# Patient Record
Sex: MALE | Race: WHITE | Employment: FULL TIME | URBAN - METROPOLITAN AREA
[De-identification: names, ages, dates, MRNs, and addresses within clinical notes are randomized per-mention and may not be internally consistent; named-entity substitution may affect disease eponyms.]

---

## 2017-10-27 ENCOUNTER — OFFICE VISIT (OUTPATIENT)
Dept: ORTHOPEDIC SURGERY | Age: 57
End: 2017-10-27

## 2017-10-27 VITALS
SYSTOLIC BLOOD PRESSURE: 118 MMHG | BODY MASS INDEX: 24.11 KG/M2 | WEIGHT: 150 LBS | HEIGHT: 66 IN | DIASTOLIC BLOOD PRESSURE: 72 MMHG | HEART RATE: 74 BPM

## 2017-10-27 DIAGNOSIS — S43.431A TEAR OF RIGHT GLENOID LABRUM, INITIAL ENCOUNTER: ICD-10-CM

## 2017-10-27 DIAGNOSIS — M25.511 RIGHT SHOULDER PAIN, UNSPECIFIED CHRONICITY: Primary | ICD-10-CM

## 2017-10-27 PROCEDURE — 73030 X-RAY EXAM OF SHOULDER: CPT | Performed by: ORTHOPAEDIC SURGERY

## 2017-10-27 PROCEDURE — 99215 OFFICE O/P EST HI 40 MIN: CPT | Performed by: ORTHOPAEDIC SURGERY

## 2017-10-27 RX ORDER — METHYLPREDNISOLONE 4 MG/1
TABLET ORAL
COMMUNITY
Start: 2017-09-27

## 2017-10-27 RX ORDER — ATORVASTATIN CALCIUM 20 MG/1
20 TABLET, FILM COATED ORAL
COMMUNITY
Start: 2017-09-27

## 2017-10-28 NOTE — PROGRESS NOTES
12 [facility]  History and Physical  Upper Extremity Pain    Chief Complaint    Shoulder Pain (np right shoulder. pain for 1 month )      History of Present Illness:Review  Brie Burks is a 62 y.o. male with about 1 month of shoulder pain. He notes a single episode where he helt a twinge of pain reaching to  a shoe and the pain has not resolved. He has also noted some sporadic numbness in his R hand as well during this time. Pain Assessment  Location of Pain: Shoulder  Location Modifiers: Right  Severity of Pain: 3  Quality of Pain: Sharp, Aching  Duration of Pain: Persistent  Frequency of Pain: Constant  Aggravating Factors:  (certain movement )  Limiting Behavior: Yes  Relieving Factors:  (no relief )  Result of Injury: No  Work-Related Injury: No  Are there other pain locations you wish to document?: No       Medical History:    Review of Systems   Musculoskeletal: Positive for joint pain and neck pain. Right shoulder pain                12 [facility]  History and Physical  Upper Extremity Pain    Chief Complaint    Shoulder Pain (np right shoulder. pain for 1 month )      History of Present Illness:Review  Brie Burks is a 62 y.o. male with about 1 month of shoulder pain. He notes a single episode where he helt a twinge of pain reaching to  a shoe and the pain has not resolved. He has also noted some sporadic numbness in his R hand as well during this time.      Pain Assessment  Location of Pain: Shoulder  Location Modifiers: Right  Severity of Pain: 3  Quality of Pain: Sharp, Aching  Duration of Pain: Persistent  Frequency of Pain: Constant  Aggravating Factors:  (certain movement )  Limiting Behavior: Yes  Relieving Factors:  (no relief )  Result of Injury: No  Work-Related Injury: No  Are there other pain locations you wish to document?: No       Medical History:    Review of Systems   Musculoskeletal: Positive for joint pain and neck pain. Right shoulder pain        Patient's medications, allergies, past medical, surgical, social and family histories were reviewed and updated as appropriate. Past Medical History:   Diagnosis Date    Hyperlipidemia     MVP (mitral valve prolapse)       Social History     Social History    Marital status:      Spouse name: N/A    Number of children: N/A    Years of education: N/A     Occupational History    Not on file. Social History Main Topics    Smoking status: Former Smoker    Smokeless tobacco: Never Used      Comment: QUIT 1984 AFTER SMOKING A FEW YEARS    Alcohol use 3.6 oz/week     6 Cans of beer per week      Comment: ON WEEKENDS    Drug use: No    Sexual activity: Not on file     Other Topics Concern    Not on file     Social History Narrative    No narrative on file       Allergies   Allergen Reactions    Codeine Other (See Comments)     Mood altering. Causes pt to become mean, short tempered.  Sulfa Antibiotics      Current Outpatient Prescriptions on File Prior to Visit   Medication Sig Dispense Refill    VIAGRA 50 MG tablet       ibuprofen (ADVIL;MOTRIN) 800 MG tablet Take 1 tablet by mouth every 8 hours as needed for Pain 30 tablet 0    methocarbamol (ROBAXIN) 500 MG tablet Take 1 tablet by mouth 4 times daily as needed May take 1-3 pills, do not exceed 16 pills in a day. 40 tablet 0    oxyCODONE-acetaminophen (PERCOCET) 5-325 MG per tablet 1-2 pills every 4 hours as needed for pain. 15 tablet 0    atorvastatin (LIPITOR) 20 MG tablet Take 20 mg by mouth daily.  aspirin 81 MG tablet Take 81 mg by mouth daily. No current facility-administered medications on file prior to visit.           Review of Systems:  A 14 point review of systems available in the scanned medical record, under the media tab, as documented by the patient.   The review is negative with the exception of those things mentioned in the HPI and Past Medical History. Vital Signs:  Vitals:    10/27/17 1422   BP: 118/72   Pulse: 74       General Exam:   Constitutional: Patient is adequately groomed with no evidence of malnutrition  DTRs: Deep tendon reflexes are intact  Mental Status: The patient is oriented to time, place and person. The patient's mood and affect are appropriate. Lymphatic: The lymphatic examination bilaterally reveals all areas to be without enlargement or induration. Vascular: Examination reveals no swelling or calf tenderness. Peripheral pulses are palpable and 2+. Neurological: The patient has good coordination. There is no weakness or sensory deficit. Upper Extremity:    Right shoulder exam    Inspection:  No gross deformities, periscapular musculature is symmetric without atrophy. There is LESS scapular winging with abduction    Palpation: No significant tenderness overlying the rotator cuff footprint, bicipital groove, AC joint, or periscapular region. TTP over the posterior capsule and external rotators as well as supraspinatus. Active/Passive ROM: full in forward flexion, abduction, external rotation with the elbow at the side. IROT limited in abduction compared to contralateral side. Strength: 5/5 strength testing of deltoid, supraspinatus, infraspinatus, teres minor, and subscapularis. Stability: negative sulcus sign, no evidence of instability    Neurovascular: Neurovascularly intact    Special Tests:Pain with empty can. (+) O'arabella.         L comparison shoulder exam    Inspection:  No gross deformities, periscapular musculature is symmetry without atrophy, no obvious winging    Palpation: No significant tenderness overlying the rotator cuff footprint, bicipital groove, AC joint, or periscapular region    Active/Passive ROM: full in forward flexion, abduction, external rotation with the elbow at the side, and internal rotation    Strength: 5/5 strength testing of deltoid, supraspinatus, infraspinatus, teres abduction, external rotation with the elbow at the side. IROT limited in abduction compared to contralateral side. Strength: 5/5 strength testing of deltoid, supraspinatus, infraspinatus, teres minor, and subscapularis. Stability: negative sulcus sign, no evidence of instability    Neurovascular: Neurovascularly intact    Special Tests:Pain with empty can. (+) O'arabella. L comparison shoulder exam    Inspection:  No gross deformities, periscapular musculature is symmetry without atrophy, no obvious winging    Palpation: No significant tenderness overlying the rotator cuff footprint, bicipital groove, AC joint, or periscapular region    Active/Passive ROM: full in forward flexion, abduction, external rotation with the elbow at the side, and internal rotation    Strength: 5/5 strength testing of deltoid, supraspinatus, infraspinatus, teres minor, and subscapularis    Stability: negative sulcus sign, no evidence of instability    Neurovascular: Neurovascularly intact      Imaging:     3 view plain radiographs of the L shoulder reviewed in the office: NO fracture or dislocation. NO evidence of tumor. Some evidence of AC arthrosis        Assessment :  L shoulder Posterior labral injury    Impression:  Encounter Diagnoses   Name Primary?  Right shoulder pain, unspecified chronicity Yes    Tear of right glenoid labrum, initial encounter          Plan:  We discussed the pathophysiology and treatment options. We  Will begin with some PT and he can take some OTC NSAIDS. He should improve with some capsule stretching and light cuff exercises. He can continue taekwando if it is non tender. We will see him back in about 4 weeks. Rajesh Felix  Fellow, [address] [place]  Date:    10/28/2017          I supervised my sports medicine fellow in the evaluation and development of a treatment plan  for this patient.   I personally interviewed the patient and performed a physical examination. In addition, I discussed the patient's condition and treatment options with them. All of their questions were answered. I personally reviewed the patient's pain scale, review of systems, family history, social history, past medical history, allergies and medications. 13 point review of systems was collected today and is filed in the medical record. Greater than 50% of the visit was spent counseling the patient. Ignacia Terrazas MD  Sports Medicine, Arthroscopic Knee and Shoulder Surgery    This dictation was performed with a verbal recognition program [facility]) and it was checked for errors. It is possible that there are still dictated errors within this office note. If so, please bring any errors to my attention for an addendum. All efforts were made to ensure that this office note is accurate.

## 2017-10-30 ENCOUNTER — HOSPITAL ENCOUNTER (OUTPATIENT)
Dept: PHYSICAL THERAPY | Age: 57
Discharge: HOME | End: 2017-10-31
Attending: ORTHOPAEDIC SURGERY | Admitting: ORTHOPAEDIC SURGERY

## 2017-11-01 ENCOUNTER — HOSPITAL ENCOUNTER (OUTPATIENT)
Dept: PHYSICAL THERAPY | Age: 57
Discharge: HOME | End: 2017-11-30
Attending: ORTHOPAEDIC SURGERY | Admitting: ORTHOPAEDIC SURGERY

## 2017-11-03 ENCOUNTER — HOSPITAL ENCOUNTER (OUTPATIENT)
Dept: PHYSICAL THERAPY | Age: 57
Discharge: HOME | End: 2017-11-03
Admitting: ORTHOPAEDIC SURGERY

## 2017-11-03 NOTE — FLOWSHEET NOTE
Scap Pinches x20    Flexion     Abduction     External Rotation     Internal Rotation     Shrugs     EXT     Reverse Flys     Serratus     Horizontal Abd with ER     Biceps     Triceps     Retraction          Cable Column/Theraband     External Rotation     Internal Rotation     Shrugs     Lats     Ext     Flex     Scapular Retraction     BIC     TRIC     PNF          Dynamic Stability          Plyoback          Manual interventions  15'   Supine Thoracic Manip - P-A x1    Seated Mid Thoracic Manip x1    Seated Cervicothoracic Manip x1                    Therapeutic Exercise and NMR EXR  [] (09865) Provided verbal/tactile cueing for activities related to strengthening, flexibility, endurance, ROM  for improvements in scapular, scapulothoracic and UE control with self care, reaching, carrying, lifting, house/yardwork, driving/computer work. [x] (96213) Provided verbal/tactile cueing for activities related to improving balance, coordination, kinesthetic sense, posture, motor skill, proprioception  to assist with  scapular, scapulothoracic and UE control with self care, reaching, carrying, lifting, house/yardwork, driving/computer work. Therapeutic Activities:    [] (45973 or 01204) Provided verbal/tactile cueing for activities related to improving balance, coordination, kinesthetic sense, posture, motor skill, proprioception and motor activation to allow for proper function of scapular, scapulothoracic and UE control with self care, carrying, lifting, driving/computer work.      Home Exercise Program:    [x] (97210) Reviewed/Progressed HEP activities related to strengthening, flexibility, endurance, ROM of scapular, scapulothoracic and UE control with self care, reaching, carrying, lifting, house/yardwork, driving/computer work  [] (51201) Reviewed/Progressed HEP activities related to improving balance, coordination, kinesthetic sense, posture, motor skill, proprioception of scapular, scapulothoracic and UE control with self care, reaching, carrying, lifting, house/yardwork, driving/computer work      Manual Treatments:  PROM / STM / Oscillations-Mobs:  G-I, II, III, IV (Crystal, Inf., Post.)  [x] (84650) Provided manual therapy to mobilize soft tissue/joints of cervical/CT, scapular GHJ and UE for the purpose of modulating pain, promoting relaxation,  increasing ROM, reducing/eliminating soft tissue swelling/inflammation/restriction, improving soft tissue extensibility and allowing for proper ROM for normal function with self care, reaching, carrying, lifting, house/yardwork, driving/computer work    Modalities:      Charges:  Timed Code Treatment Minutes: 35   Total Treatment Minutes: 65       [x] EVAL (LOW) 46932 (typically 20 minutes face-to-face)  [] EVAL (MOD) 17587 (typically 30 minutes face-to-face)  [] EVAL (HIGH) 46878 (typically 45 minutes face-to-face)  [] RE-EVAL     [] IE(24671) x      [] IONTO  [x] NMR (11465) x  1   [] VASO  [x] Manual (46000) x  1    [] Other:  [] TA x       [] Mech Traction (49960)  [] ES(attended) (44062)      [] ES (un) (00971):     GOALS:  Patient stated goal: be pain free     Therapist goals for Patient:   Short Term Goals: To be achieved in: 2 weeks  1. Independent in HEP and progression per patient tolerance, in order to prevent re-injury. 2. Patient will have a decrease in pain to facilitate improvement in movement, function, and ADLs as indicated by Functional Deficits.     Long Term Goals: To be achieved in: 4 weeks  1. Disability index score of 8% or less for the UEFS to assist with reaching prior level of function. 2. Patient will demonstrate an increase in Strength to 5/5 scapular and core control  for UE to allow for proper functional mobility as indicated by patients Functional Deficits. 3. Patient will return to sitting for prolonged periods, at least 30 min, while studyingwithout increased symptoms or restriction.      Progression Towards Functional goals:  [] Patient is progressing as expected towards functional goals listed. [] Progression is slowed due to complexities listed. [] Progression has been slowed due to co-morbidities. [x] Plan just implemented, too soon to assess goals progression  [] Other:     ASSESSMENT:  See eval    Treatment/Activity Tolerance:  [x] Patient tolerated treatment well [] Patient limited by fatique  [] Patient limited by pain  [] Patient limited by other medical complications  [] Other:     Patient education:  11/3 - postural alignment. Cervical radiculopathy causes.      Prognosis: [] Good [x] Fair  [] Poor    Patient Requires Follow-up: [x] Yes  [] No    PLAN: 1-2x/wk for 4 wks 11/3/17 - 12/1/17  [] Continue per plan of care [] Alter current plan (see comments)  [x] Plan of care initiated [] Hold pending MD visit [] Discharge    Electronically signed by: Marilyn Larios PT, DPT

## 2017-11-03 NOTE — PLAN OF CARE
[facility] [address]  Phone [phone]  Fax [phone]      Physical Therapy Certification    Dear Referring Practitioner: Ana Maria Tracy,    We had the pleasure of evaluating the following patient for physical therapy services at [address] [place] [address]. A summary of our findings can be found in the initial assessment below. This includes our plan of care. If you have any questions or concerns regarding these findings, please do not hesitate to contact me at the office phone number checked above. Thank you for the referral.       Physician Signature:_______________________________Date:__________________  By signing above (or electronic signature), therapists plan is approved by physician      Patient: Marissa Perez   : 1960   MRN: [id number]  Referring Physician: Referring Practitioner: Ana Maria Tracy      Evaluation Date: 11/3/2017      Medical Diagnosis Information:  Diagnosis: S43.431D - Tear of right glenoid labrum   Treatment Diagnosis: R scapular/shoulder pain, impaired posture, cervical radiculopathy                                         Insurance information: PT Insurance Information: [employer]    Precautions/ Contra-indications: none  Latex Allergy:  [x]NO      []YES  Preferred Language for Healthcare:   [x]English       []other:    SUBJECTIVE: Patient stated complaint: Pt is a 62 yom with primary complaint of R shoulder pain. He points to the posterior shoulder, states he gets some pain under medial to his scapula and a shot of pain into the lateral upper arm from time to time. He does report some tingling/numbness into his first and second digits - this is intermittent. This pain has been present for ~1 month and he does not recall an exciting event. He states the pain has not become worse, but it is always present. Pain is most present when he is attempting to fall asleep.  He participates in [employer] 3-5 times per week. Pt has been studying a lot and pain gets worse when he sits. He states he noticed the pain first when he was bending fwd to grab his shoe and felt a \"twinge\" in his upper back. Relevant Medical History: heart murmer  Functional Disability Index:PT G-Codes  Functional Assessment Tool Used: UEFI  Score: 16.25% impaired  Functional Limitation: Carrying, moving and handling objects  Carrying, Moving and Handling Objects Current Status (): At least 1 percent but less than 20 percent impaired, limited or restricted  Carrying, Moving and Handling Objects Goal Status (): 0 percent impaired, limited or restricted    Pain Scale: 3/10  Easing factors: rest, advil as needed   Provocative factors: sitting for prolonged periods, sleeping on R side.      Type: []Constant   [x]Intermittent  []Radiating []Localized []other:     Numbness/Tinglinst/2nd digits of hand (intermittently) - occurs most when he is sitting     Occupation/School: unemployed     Living Status/Prior Level of Function: Independent with ADLs and IADLs, active in [employer]    OBJECTIVE:     ROM PROM AROM  Comment    L R L R    Flexion 180 180 180 180    Abduction 180 180 180 180    ER 90 90 90 @ 90-90 90 @ 90-90    IR 50 50  25 @ 90-90  T3 IR reach behind back 25 @ 90-90  T8 IR reach behind back    Other(cervical)   Extension exacerbates symptoms  L SBing exacerbates  L Rot recreates   R Rot recreates     Other             Strength L R Comment   Flexion 4 4    Abduction 5 5    ER 4 4    IR 5 5    Upper Trap 5 5      Special Tests Results/Comment   Ventura-Leo neg   Neers neg   Speeds neg   OBriens Mildly positive   Other + Spurling on R     Reflexes/Sensation:    []Dermatomes/Myotomes intact    [x]Reflexes equal and normal bilaterally   [x]Other: intact but diminished sensation within C5/6/7 dermatomes    Joint mobility:    [x]Normal    []Hypo   []Hyper    Palpation: TTP at posterior shoulder    Functional Mobility/Transfers: WNL    Posture: mod fwd head/ant rounded shoulders bilaterally     Bandages/Dressings/Incisions N/A    Gait: (include devices/WB status): WNL    [x] Patient history, allergies, meds reviewed. Medical chart reviewed. See intake form. Review Of Systems (ROS):  [x]Performed Review of systems (Integumentary, CardioPulmonary, Neurological) by intake and observation. Intake form has been scanned into medical record. Patient has been instructed to contact their primary care physician regarding ROS issues if not already being addressed at this time. Co-morbidities/Complexities (which will affect course of rehabilitation):  [x]None           Arthritic conditions   []Rheumatoid arthritis (M05.9)  []Osteoarthritis (M19.91)   Cardiovascular conditions   []Hypertension (I10)  []Hyperlipidemia (E78.5)  []Angina pectoris (I20)  []Atherosclerosis (I70)   Musculoskeletal conditions   []Disc pathology   []Congenital spine pathologies   []Prior surgical intervention  []Osteoporosis (M81.8)  []Osteopenia (M85.8)   Endocrine conditions   []Hypothyroid (E03.9)  []Hyperthyroid Gastrointestinal conditions   []Constipation (X20.05)   Metabolic conditions   []Morbid obesity (E66.01)  []Diabetes type 1(E10.65) or 2 (E11.65)   []Neuropathy (G60.9)     Pulmonary conditions   []Asthma (J45)  []Coughing   []COPD (J44.9)   Psychological Disorders  []Anxiety (F41.9)  []Depression (F32.9)   []Other:   []Other:          Barriers to/and or personal factors that will affect rehab potential:              []Age  []Sex              []Motivation/Lack of Motivation                        []Co-Morbidities              []Cognitive Function, education/learning barriers              []Environmental, home barriers              []profession/work barriers  []past PT/medical experience  []other:  Justification:      Falls Risk Assessment (30 days):   [x] Falls Risk assessed and no intervention required.   [] Falls with post-surgical status including decreased ROM, strength and function. []Signs/symptoms consistent with joint sprain/strain   []Signs/symptoms consistent with shoulder impingement   []Signs/symptoms consistent with shoulder/elbow/wrist tendinopathy   []Signs/symptoms consistent with Rotator cuff tear   []Signs/symptoms consistent with labral tear   []Signs/symptoms consistent with postural dysfunction    []Signs/symptoms consistent with Glenohumeral IR Deficit - <45 degrees   [x]Signs/symptoms consistent with facet dysfunction of cervical/thoracic spine    []Signs/symptoms consistent with pathology which may benefit from Dry needling     []other:     Prognosis/Rehab Potential:      []Excellent   []Good    [x]Fair   []Poor    Tolerance of evaluation/treatment:    []Excellent   [x]Good    []Fair   []Poor    Physical Therapy Evaluation Complexity Justification  [x] A history of present problem with:  [x] no personal factors and/or comorbidities that impact the plan of care;  []1-2 personal factors and/or comorbidities that impact the plan of care  []3 personal factors and/or comorbidities that impact the plan of care  [x] An examination of body systems using standardized tests and measures addressing any of the following: body structures and functions (impairments), activity limitations, and/or participation restrictions;:  [] a total of 1-2 or more elements   [] a total of 3 or more elements   [x] a total of 4 or more elements   [x] A clinical presentation with:  [x] stable and/or uncomplicated characteristics   [] evolving clinical presentation with changing characteristics  [] unstable and unpredictable characteristics;   [x] Clinical decision making of [x] low, [] moderate, [] high complexity using standardized patient assessment instrument and/or measurable assessment of functional outcome.     [x] EVAL (LOW) 24910 (typically 20 minutes face-to-face)  [] EVAL (MOD) 77428 (typically 30 minutes face-to-face)  [] EVAL (HIGH) 54133 (typically 45 minutes face-to-face)  [] RE-EVAL       PLAN:  Frequency/Duration:  1-2 days per week for 3-4 Weeks:  INTERVENTIONS:  [x] Therapeutic exercise including: strength training, ROM, for Upper extremity and core   [x]  NMR activation and proprioception for UE, scap and Core   [x] Manual therapy as indicated for shoulder, scapula and spine to include: Dry Needling/IASTM, STM, PROM, Gr I-IV mobilizations, manipulation. [x] Modalities as needed that may include: thermal agents, E-stim, Biofeedback, US, iontophoresis as indicated  [x] Patient education on joint protection, postural re-education, activity modification, progression of HEP. HEP instruction: (see scanned forms)    GOALS:  Patient stated goal: be pain free    Therapist goals for Patient:   Short Term Goals: To be achieved in: 2 weeks  1. Independent in HEP and progression per patient tolerance, in order to prevent re-injury. 2. Patient will have a decrease in pain to facilitate improvement in movement, function, and ADLs as indicated by Functional Deficits. Long Term Goals: To be achieved in: 4 weeks  1. Disability index score of 8% or less for the UEFS to assist with reaching prior level of function. 2. Patient will demonstrate an increase in Strength to 5/5 scapular and core control  for UE to allow for proper functional mobility as indicated by patients Functional Deficits. 3. Patient will return to sitting for prolonged periods, at least 30 min, while studyingwithout increased symptoms or restriction.         Electronically signed by:  José Luis Rodriguez PT

## 2017-11-10 ENCOUNTER — HOSPITAL ENCOUNTER (OUTPATIENT)
Dept: PHYSICAL THERAPY | Age: 57
Discharge: HOME | End: 2017-11-10
Admitting: ORTHOPAEDIC SURGERY

## 2017-11-10 NOTE — FLOWSHEET NOTE
[address], [address] [place] [address], [address] [place] [address]  Phone: [phone]   Fax:     [phone]    Physical Therapy Daily Treatment Note  Date:  11/10/2017    Patient Name:  Fabián Bailey    :  1960  MRN: [id number]  Restrictions/Precautions:    Medical/Treatment Diagnosis Information:  Diagnosis: S43.431D - Tear of right glenoid labrum  Treatment Diagnosis: R scapular/shoulder pain, impaired posture, cervical radiculopathy  Insurance/Certification information:  PT Insurance Information: [employer]  Physician Information:  Referring Practitioner: Natalya Saravia  Plan of care signed (Y/N):     Date of Patient follow up with Physician:     G-Code (if applicable):      Date G-Code Applied:  11/3/17       Progress Note: [x]  Yes  []  No  Next due by: Visit #10      Latex Allergy:  [x]NO      []YES  Preferred Language for Healthcare:   [x]English       []other:    Visit # Insurance Allowable   2 60     Pain level: At eval - 3/10   11/10 - 1/10    SUBJECTIVE:    At eval -   11/10 - feeling better. Some tingling time to time. Pain seems to be localized in shoudler and feels tingling is going away.      OBJECTIVE: See eval  Observation:    Test measurements:      RESTRICTIONS/PRECAUTIONS none    Exercises/Interventions:   Exercises:  Exercise/Equipment Resistance/Repetitions Other comments   Stretching/PROM     Wand     Table Slides     UE [place]     Pulleys     Pendulum     Pec Minor Doorway stretch 3x30\" 11/10   Isometrics     Supine Chin Tucks 10x10\" 11/10 - Occiput propped on towel roll   Supine Chin Tuck w/ Cervical Flexion 10x8\" holds 11/10   Retraction          Weight shift     Flexion     Abduction     External Rotation     Internal Rotation     Biceps     Triceps          PRE's     Scap Pinches x20 11/10   No Moneys 3x10, blue TB 11/10   Flexion     Abduction     External Rotation     Internal Rotation     Shrugs     EXT progressing as expected towards functional goals listed. [] Progression is slowed due to complexities listed. [] Progression has been slowed due to co-morbidities. [x] Plan just implemented, too soon to assess goals progression  [] Other:     ASSESSMENT:    11/10 - toleratred tx well. Manipulated thoracic spine, resulting in audible cavitation. Began supein chin tuck with neck flexion for deep cervical endurance training. Pt will add to HEP. All questions answered. Treatment/Activity Tolerance:  [x] Patient tolerated treatment well [] Patient limited by fatique  [] Patient limited by pain  [] Patient limited by other medical complications  [] Other:     Patient education:  11/3 - postural alignment. Cervical radiculopathy causes.      Prognosis: [] Good [x] Fair  [] Poor    Patient Requires Follow-up: [x] Yes  [] No    PLAN: 1-2x/wk for 4 wks 11/3/17 - 12/1/17  [] Continue per plan of care [] Alter current plan (see comments)  [x] Plan of care initiated [] Hold pending MD visit [] Discharge    Electronically signed by: Lonnie Robison PT, DPT

## 2017-11-15 ENCOUNTER — HOSPITAL ENCOUNTER (OUTPATIENT)
Dept: PHYSICAL THERAPY | Age: 57
Discharge: HOME | End: 2017-11-15
Admitting: ORTHOPAEDIC SURGERY

## 2017-11-15 NOTE — FLOWSHEET NOTE
[address], [address] [place] [address], [address] [place] [address]  Phone: [phone]   Fax:     [phone]    Physical Therapy Daily Treatment Note  Date:  11/15/2017    Patient Name:  Fabián Bailey    :  1960  MRN: [id number]  Restrictions/Precautions:    Medical/Treatment Diagnosis Information:  Diagnosis: S43.431D - Tear of right glenoid labrum  Treatment Diagnosis: R scapular/shoulder pain, impaired posture, cervical radiculopathy  Insurance/Certification information:  PT Insurance Information: [employer]  Physician Information:  Referring Practitioner: Natalya Saravia  Plan of care signed (Y/N):     Date of Patient follow up with Physician:     G-Code (if applicable):      Date G-Code Applied:  11/3/17       Progress Note: [x]  Yes  []  No  Next due by: Visit #10      Latex Allergy:  [x]NO      []YES  Preferred Language for Healthcare:   [x]English       []other:    Visit # Insurance Allowable   3 60     Pain level: At [facility] - 3/10   11/15 - 1/10    SUBJECTIVE:    At [facility] - Pt is a 62 yom with primary complaint of R shoulder pain. He points to the posterior shoulder, states he gets some pain under medial to his scapula and a shot of pain into the lateral upper arm from time to time. He does report some tingling/numbness into his first and second digits - this is intermittent. This pain has been present for ~1 month and he does not recall an exciting event. He states the pain has not become worse, but it is always present. Pain is most present when he is attempting to fall asleep. He participates in [employer] 3-5 times per week. Pt has been studying a lot and pain gets worse when he sits. He states he noticed the pain first when he was bending fwd to grab his shoe and felt a \"twinge\" in his upper back. 11/15 - doing better. Still some tingling in hand time.      OBJECTIVE: See [facility]  Observation:    Test measurements: RESTRICTIONS/PRECAUTIONS:  none    Exercises/Interventions:   Exercises:  Exercise/Equipment Resistance/Repetitions Other comments   Stretching/PROM     Wand     Table Slides     UE [place]     Pulleys     Pendulum     Seated [place] x10 11/15   Pec Minor Doorway stretch 3x30\" 11/15   Isometrics     Supine Chin Tuck w/ Cervical Flexion 15x10\" holds ^11/15   Retraction          Weight shift     Flexion     Abduction     External Rotation     Internal Rotation     Biceps     Triceps          PRE's     Scap Pinches x20 11/15   No Moneys 3x10, blackTB ^11/15   Seated Horiz Abd/ER 3x10, green TB 11/15   Flexion     Abduction     External Rotation     Internal Rotation     Shrugs     EXT     Reverse Flys     Serratus     Horizontal Abd with ER     Biceps     Triceps     Retraction          Cable Column/Theraband     External Rotation     Internal Rotation     Shrugs     Lats     Ext     Flex     Scapular Retraction     BIC     TRIC     PNF          Dynamic Stability          [place]          Manual interventions  20'   Cervical Distraction 5' 11/15   Thoracic Mobs P-A, grades 3/4, entire T-Spine 11/15   Supine Thoracic Manip - P-A x1 11/15   Seated Mid Thoracic Manip x1 11/15   Seated Cervicothoracic Manip x1 11/15   Median [place] x20 11/15              Therapeutic Exercise and NMR EXR  [] (16481) Provided verbal/tactile cueing for activities related to strengthening, flexibility, endurance, ROM  for improvements in scapular, scapulothoracic and UE control with self care, reaching, carrying, lifting, house/yardwork, driving/computer work. [x] (21940) Provided verbal/tactile cueing for activities related to improving balance, coordination, kinesthetic sense, posture, motor skill, proprioception  to assist with  scapular, scapulothoracic and UE control with self care, reaching, carrying, lifting, house/yardwork, driving/computer work.     Therapeutic Activities:    [] (75794 or 69032) Provided

## 2017-12-01 ENCOUNTER — HOSPITAL ENCOUNTER (OUTPATIENT)
Dept: PHYSICAL THERAPY | Age: 57
Discharge: HOME | End: 2017-12-31
Attending: ORTHOPAEDIC SURGERY | Admitting: ORTHOPAEDIC SURGERY

## 2017-12-19 ENCOUNTER — OFFICE VISIT (OUTPATIENT)
Dept: ORTHOPEDIC SURGERY | Age: 57
End: 2017-12-19

## 2017-12-19 VITALS
SYSTOLIC BLOOD PRESSURE: 123 MMHG | WEIGHT: 150 LBS | HEART RATE: 66 BPM | BODY MASS INDEX: 24.11 KG/M2 | HEIGHT: 66 IN | DIASTOLIC BLOOD PRESSURE: 75 MMHG

## 2017-12-19 DIAGNOSIS — M25.551 RIGHT HIP PAIN: Primary | ICD-10-CM

## 2017-12-19 DIAGNOSIS — S76.301A HAMSTRING INJURY, RIGHT, INITIAL ENCOUNTER: ICD-10-CM

## 2017-12-19 PROCEDURE — 99214 OFFICE O/P EST MOD 30 MIN: CPT | Performed by: ORTHOPAEDIC SURGERY

## 2017-12-19 PROCEDURE — 73502 X-RAY EXAM HIP UNI 2-3 VIEWS: CPT | Performed by: ORTHOPAEDIC SURGERY

## 2017-12-19 NOTE — PROGRESS NOTES
Date    Hyperlipidemia     MVP (mitral valve prolapse)      Past Surgical History:   Procedure Laterality Date    BACK SURGERY  8/1992    L5/S1    KNEE ARTHROPLASTY Left 05/12/2014    LEFT KNEE ARTHROSCOPIC ANTERIOR CRUCIATE LIGAMENT       Allergies:  Codeine and Sulfa antibiotics    Medications:  Outpatient Prescriptions Marked as Taking for the 12/19/17 encounter (Office Visit) with Neptali Escoto MD   Medication Sig Dispense Refill    atorvastatin (LIPITOR) 20 MG tablet Take 20 mg by mouth      VIAGRA 50 MG tablet       ibuprofen (ADVIL;MOTRIN) 800 MG tablet Take 1 tablet by mouth every 8 hours as needed for Pain 30 tablet 0    atorvastatin (LIPITOR) 20 MG tablet Take 20 mg by mouth daily.  aspirin 81 MG tablet Take 81 mg by mouth daily. Social History     Social History    Marital status:      Spouse name: N/A    Number of children: N/A    Years of education: N/A     Occupational History    Not on file. Social History Main Topics    Smoking status: Former Smoker    Smokeless tobacco: Never Used      Comment: QUIT 1984 AFTER SMOKING A FEW YEARS    Alcohol use 3.6 oz/week     6 Cans of beer per week      Comment: ON WEEKENDS    Drug use: No    Sexual activity: Not on file     Other Topics Concern    Not on file     Social History Narrative    No narrative on file     No family history on file. Review of Systems:    Cinda Prasad reported review of systems has been reviewed and has been scanned into his medical record for today's visit. The scanned image can be found in media images folder. He was instructed to contact his primary care physician regarding ROS positives if not already being addressed during today's visit. Objective:   Physical Exam  Vital Signs:  /75   Pulse 66   Ht 5' 6\" (1.676 m)   Wt 150 lb (68 kg)   BMI 24.21 kg/m²     Constitution:  Generally, Veverly Given is [x] alert, [x] appears stated age, and [x] in no distress.   His general body habitus is [] Cachectic  [] Thin  [x] Normal  [] Obese  [] Morbidly Obese    Head: [x] Normocephalic  Eyes: [x] Extra-occular muscles intact  Left Ear: [x] External Ear normal   Right Ear: [x] External Ear normal   Nose: [x] Normal  Mouth: [x] Oral mucosa moist  [x] No perioral lesions    Pulmonary: [x] Respirations unlabored and regular  Skin: [x] Warm [x] Well perfused     Psychiatric:   [x] Good judgement and insight  [x] Oriented to [x] person, [x] place, and [x] time  [x] Mood appropriate for circumstances    Gait:   Gait is [x] Normal  [] Impaired   Assistive Device: [x] None  [] Knee Brace  [] Cane  [] Crutches   [] Wyline Setters   [] Wheelchair  [] Other     ORTHOPAEDIC LS SPINE EXAM   Inspection:  [x] Skin intact without abrasion, lacerations, surgical scars, pigment changes, dimpling, hairy patches or rashes  [x] Normal back alignment  [] Scoliosis [] Kyphosis  [] Dimpling  [] Hyper/hypopigmentation  [] Hairy Patches  [] Scar / Surgical incision    Palpation:   Nontender    Provocative Tests:  Negative Straight leg raise test    RIGHT HIP ORTHOPAEDIC  EXAM:   Inspection:  [x] Skin intact without abrasion, lacerations or rashes  [x] Leg lengths equal  [] Ecchymosis:  [x] none  [] mild  [] moderate  [] severe   [] Atrophy:  [x] none  [] mild  [] moderate  [] severe      Range of Motion:  [x] No flexion contracture         [] Deferred: acute injury/post-surgery/pain  [] Flexion contracture    Forward flexion: 110  Supine Internal rotation: 25  Supine External rotation: 65  Abduction: 50  Adduction: 30      Palpation:   Tender to palpation over ischial tuberosity    Provocative Tests:  [x] Negative  Positive Tests:  [] Log Roll   [] Robert Test: ITB Tightness   [] FADIR Anterior impingement:    [] Posterior Impingement    [] Shuck test for insufficient suction seal   [] Dial test for capsular insufficiency:    [] Resisted adduction for athletic pubalgia   [] Resisted curl up for athletic pubalgia     Motor Function:  [x] No gross motor weakness of hip [x] No gross motor weakness of knee  [x] No gross motor weakness of ankle    [x] No gross motor weakness of great toe    [] Motor strength:   [x] Hip Flex [x] 5/5 [] 4/5 [] 3/5 [] 2/5 [] 1/5 [] 0/5   [x] Hip ABductors [x] 5/5 [] 4/5 [] 3/5 [] 2/5 [] 1/5 [] 0/5   [x] Hip ADductors [x] 5/5 [] 4/5 [] 3/5 [] 2/5 [] 1/5 [] 0/5     4/5 motor strength with hip extension as well as hamstring strength testing     Neurologic:   [x] Sensation to light touch intact  [x] Coordination / proprioception intact  Motor function intact L2-S1    Circulation:  [x] The limb is warm and well perfused. [x] Capillary refill is intact.   [] Edema:  [x] none  [] mild  [] moderate  [] severe     Assessment of Joint Laxity:    Beighton hypermobility score (0-9): 0  [] Small fingers passively able to extend beyond 90 degrees (2 pts)   [] Thumbs passively able to flex to flexor aspect of forearm (2 pts)   [] Elbows hyperextend beyond 10 degrees (2 pts)   [] Knees hyperextend beyond 10 degrees (2 pts)   [] Can rest palms on floor with forward flexion of trunk with knees extended (1 pt)          LEFT HIP ORTHOPAEDIC  EXAM:  Inspection:  [x] Skin intact without abrasion, lacerations or rashes  [x] Leg lengths equal  [] Ecchymosis:  [x] none  [] mild  [] moderate  [] severe   [] Atrophy:  [x] none  [] mild  [] moderate  [] severe      Range of Motion:  [x] No flexion contracture         [] Deferred: acute injury/post-surgery/pain  [] Flexion contracture     Forward flexion: 110  Supine Internal rotation: 25  Supine External rotation: 65  Abduction: 50  Adduction: 30      Palpation:   Nontender    Provocative Tests:  [x] Negative  Positive Tests:  [] Log Roll   [] Robert Test: ITB Tightness   [] FADIR Anterior impingement:    [] Posterior Impingement    [] Shuck test for insufficient suction seal   [] Dial test for capsular insufficiency:    [] Resisted adduction for athletic pubalgia   [] Resisted curl up for athletic pubalgia     Motor Function:  [x] No gross motor weakness of hip [x] No gross motor weakness of knee  [x] No gross motor weakness of ankle    [x] No gross motor weakness of great toe    [] Motor strength:   [x] Hip Flex [] 5/5 [] 4/5 [] 3/5 [] 2/5 [] 1/5 [] 0/5   [x] Hip ABductors [] 5/5 [] 4/5 [] 3/5 [] 2/5 [] 1/5 [] 0/5   [x] Hip ADductors [] 5/5 [] 4/5 [] 3/5 [] 2/5 [] 1/5 [] 0/5     Neurologic:  [x] Sensation to light touch intact  [x] Coordination / proprioception intact    Circulation:  [x] The limb is warm and well perfused. [x] Capillary refill is intact. [] Edema:  [x] none  [] mild  [] moderate  [] severe     Data Reviewed:     XRays:  (2 views: Standing AP, 45 degree Kaye) of his right hip and pelvis taken today 12/19/17 in the office and reviewed by me personally showed: No fractures or dislocations well-preserved joint space        Assessment:     Meron Estrada is a 62y.o. year old male who appears to have a right hamstring injury. Most likely this is a strain or partial tear as he does not have any history of bruising    Diagnosis:   1. Right hip pain  XR HIP RIGHT (2-3 VIEWS)   2. Hamstring injury, right, initial encounter  MRI Hip Right WO Contrast         Plan:     I discussed the diagnosis and the treatment options with Meron Estrada today. I'm going to send her for an MRI of the hip to assess for any structural tearing of the proximal hamstring origin       Return to Clinic/Follow - Up:  Follow up after MRI. We likely will start some physical therapy    Meron Estrada was instructed to call the office if his symptoms worsen or if new symptoms appear prior to the next scheduled visit. He is specifically instructed to contact the office between now & his scheduled appointment if he has concerns related to his condition or if he needs assistance in scheduling the above tests. He is welcome to call for an appointment sooner if he has any additional concerns or questions.           Patient Education Materials Provided:  [x] Dr Trish Quiles: New patient folder,  Anatomic Drawings and treatment algorithms    There are no Patient Instructions on file for this visit. Orders Placed This Encounter   Procedures    XR HIP RIGHT (2-3 VIEWS)     [id number]     Order Specific Question:   Reason for exam:     Answer:   Pain, room 2    MRI Hip Right WO Contrast     Order Specific Question:   Reason for exam:     Answer:   Assess for hamstring tear       Refills/New Prescriptions:  No orders of the defined types were placed in this encounter. Today's prescription medications will be e-scribed (when appropriate) to the Patient's Preferred Pharmacy:   [facility] [address], [address] [place] [address] [address]  Phone: [phone] Fax: [phone]         Ambika Shin MD  Orthopaedic Surgeon, Sports Medicine  Director, Hip Arthroscopy and [address] [place] [address] and [facility]    Contact Information:  ([place] [address] [phone])  [facility] main number: use after hours [phone])    This dictation was performed with a verbal recognition program (DRAGON) and it was checked for errors. It is possible that there are still dictated errors within this office note. If so, please bring any errors to my attention for an addendum. All efforts were made to ensure that this office note is accurate.

## 2018-01-05 ENCOUNTER — OFFICE VISIT (OUTPATIENT)
Dept: ORTHOPEDIC SURGERY | Age: 58
End: 2018-01-05

## 2018-01-05 VITALS
BODY MASS INDEX: 24.11 KG/M2 | WEIGHT: 150 LBS | HEIGHT: 66 IN | DIASTOLIC BLOOD PRESSURE: 85 MMHG | HEART RATE: 73 BPM | SYSTOLIC BLOOD PRESSURE: 142 MMHG

## 2018-01-05 DIAGNOSIS — S76.311D PARTIAL HAMSTRING TEAR, RIGHT, SUBSEQUENT ENCOUNTER: Primary | ICD-10-CM

## 2018-01-05 PROCEDURE — 99213 OFFICE O/P EST LOW 20 MIN: CPT | Performed by: ORTHOPAEDIC SURGERY

## 2018-01-05 NOTE — PROGRESS NOTES
Suyapa White MD  Orthopaedic Surgery & Sports Medicine  Shoulder, Hip & Knee Specialist                       MAIN PHONE NUMBER  [phone]      PATIENT: Isra Wagner    62 y.o.  male  [facility]: 1960   MRN:  [id number]       Date of current encounter: 1/5/2018  This encounter is evaluated as a:       [] New Patient Visit    [x] Established Patient Visit   [] Post-Op Visit     [] Consult: requested by          [] Worker's Comp       Patient's PCP is Dr. Bridget Adamson    Subjective:     Chief Complaint   Patient presents with    Follow-up     rt hip MRI results       HPI:  Iván Connor follows up with us regarding his right hip. Pain Assessment  Location of Pain:  (hip)  Location Modifiers: Right  Severity of Pain: 1  Quality of Pain: Dull, Aching  Frequency of Pain: Rarely  Limiting Behavior: Yes  Result of Injury: No  Work-Related Injury: No  Are there other pain locations you wish to document?: No    Patient Active Problem List   Diagnosis    Sprain of cruciate ligament of knee    Tear of medial cartilage or meniscus of knee, current     Past Medical History:   Diagnosis Date    Hyperlipidemia     MVP (mitral valve prolapse)      Past Surgical History:   Procedure Laterality Date    BACK SURGERY  8/1992    L5/S1    KNEE ARTHROPLASTY Left 05/12/2014    LEFT KNEE ARTHROSCOPIC ANTERIOR CRUCIATE LIGAMENT       Allergies:  Codeine and Sulfa antibiotics    Medications:  Outpatient Prescriptions Marked as Taking for the 1/5/18 encounter (Office Visit) with Suyapa White MD   Medication Sig Dispense Refill    atorvastatin (LIPITOR) 20 MG tablet Take 20 mg by mouth      methylPREDNISolone (MEDROL DOSEPACK) 4 MG tablet       VIAGRA 50 MG tablet       ibuprofen (ADVIL;MOTRIN) 800 MG tablet Take 1 tablet by mouth every 8 hours as needed for Pain 30 tablet 0    methocarbamol (ROBAXIN) 500 MG tablet Take 1 tablet by mouth 4 times daily as needed May take 1-3 pills, do not exceed 16 pills in a day.  36 tablet 0    oxyCODONE-acetaminophen (PERCOCET) 5-325 MG per tablet 1-2 pills every 4 hours as needed for pain. 15 tablet 0    atorvastatin (LIPITOR) 20 MG tablet Take 20 mg by mouth daily.  aspirin 81 MG tablet Take 81 mg by mouth daily. Social History     Social History    Marital status:      Spouse name: N/A    Number of children: N/A    Years of education: N/A     Occupational History    Not on file. Social History Main Topics    Smoking status: Former Smoker    Smokeless tobacco: Never Used      Comment: QUIT 1984 AFTER SMOKING A FEW YEARS    Alcohol use 3.6 oz/week     6 Cans of beer per week      Comment: ON WEEKENDS    Drug use: No    Sexual activity: Not on file     Other Topics Concern    Not on file     Social History Narrative    No narrative on file     No family history on file. Review of Systems:    Delores Cortez reported review of systems has been reviewed and has been scanned into his medical record for today's visit. The scanned image can be found in media images folder. He was instructed to contact his primary care physician regarding ROS positives if not already being addressed during today's visit. Objective:   Physical Exam  Vital Signs:  BP (!) 142/85   Pulse 73   Ht 5' 6\" (1.676 m)   Wt 150 lb (68 kg)   BMI 24.21 kg/m²     Constitution:  Generally, Aguilar Clark is [x] alert, [x] appears stated age, and [x] in no distress.   His general body habitus is [] Cachectic  [] Thin  [x] Normal  [] Obese  [] Morbidly Obese    Head: [x] Normocephalic  Eyes: [x] Extra-occular muscles intact  Left Ear: [x] External Ear normal   Right Ear: [x] External Ear normal   Nose: [x] Normal  Mouth: [x] Oral mucosa moist  [x] No perioral lesions    Pulmonary: [x] Respirations unlabored and regular  Skin: [x] Warm [x] Well perfused     Psychiatric:   [x] Good judgement and insight  [x] Oriented to [x] person, [x] place, and [x] time  [x] Mood appropriate for circumstances    Gait:   Gait is [x] Normal  [] Impaired   Assistive Device: [x] None  [] Knee Brace  [] Cane  [] Crutches   [] Garth Kay   [] Wheelchair  [] Other     [address] [facility] EXAM   Inspection:  [x] Skin intact without abrasion, lacerations, surgical scars, pigment changes, dimpling, hairy patches or rashes  [x] Normal back alignment  [] Scoliosis [] Kyphosis  [] Dimpling  [] Hyper/hypopigmentation  [] Hairy Patches  [] Scar / Surgical incision    Palpation:   Nontender    Provocative Tests:  Negative Straight leg raise test    RIGHT HIP ORTHOPAEDIC  EXAM:   Inspection:  [x] Skin intact without abrasion, lacerations or rashes  [x] Leg lengths equal  [] Ecchymosis:  [x] none  [] mild  [] moderate  [] severe   [] Atrophy:  [x] none  [] mild  [] moderate  [] severe      Range of Motion:  [x] No flexion contracture         [] Deferred: acute injury/post-surgery/pain  [] Flexion contracture    Forward flexion: 110  Supine Internal rotation: 25  Supine External rotation: 65  Abduction: 50  Adduction: 30        Palpation:   Mild soreness palpation right hip ischial tuberosity    Provocative Tests:  [x] Negative  Positive Tests:  [] Log Roll   [] Robert Test: ITB Tightness   [] FADIR Anterior impingement:    [] Posterior Impingement    [] Shuck test for insufficient suction seal   [] Dial test for capsular insufficiency:    [] Resisted adduction for athletic pubalgia   [] Resisted curl up for athletic pubalgia     Motor Function:  [x] No gross motor weakness of hip [x] No gross motor weakness of knee  [x] No gross motor weakness of ankle    [x] No gross motor weakness of great toe    [] Motor strength:   [x] Hip Flex [x] 5/5 [] 4/5 [] 3/5 [] 2/5 [] 1/5 [] 0/5   [x] Hip ABductors [x] 5/5 [] 4/5 [] 3/5 [] 2/5 [] 1/5 [] 0/5   [x] Hip ADductors [x] 5/5 [] 4/5 [] 3/5 [] 2/5 [] 1/5 [] 0/5     5-/5 strength right hamstring with resisted hip extension    Neurologic:   [x] Sensation to light touch intact  [x] Coordination /     Peritendinous inflammation along the left hamstring tendon complex to a slightly lesser extent,    partially visualized. Intertarsal microtearing of the proximal semimembranosus insertion    tendon origin measures approximately 13.5mm in length x 3.5mm in width x 3mm AP dimension. No    full-thickness tendon tear or avulsion.        Abductor tendinous insertions on the greater trochanters are intact. Mild peritendinous    inflammation.        No occult fracture, stress fracture, or AVN of the hip joints. No high-grade degenerative    arthropathic changes.        Visualized sacroiliac joints are unremarkable. No sacral stress or insufficiency fracture. Mild    pubic symphyseal degenerative arthrosis.        Visualized intrapelvic structures are unremarkable.        Marrow edema involving the ischial tuberosities as stress injury and/or stress change.        Visualized portions of the hamstring musculature are intact without high-grade muscle strain or    tear.        Mild inflammation of the visualized right sciatica nerve.                CONCLUSION:    1. Right hamstring tendon complex strain with diffuse peritendinous inflammation. Regions of    interstitial microtearing involving the semimembranosus origin tendon and the common tendon of    the biceps femoris and semitendinosus. No full-thickness tendon tear or avulsion. Similar    findings to a lesser extent involving the left hamstring tendon complex. Marrow edema involving    the ischial tuberosities as stress injury and/or stress change. 2. Mild peritendinous inflammation along the abductor tendinous insertions on the greater    trochanters without high-grade tear or avulsion. 3. Mild inflammation of the visualized right sciatica nerve.        Thank you for the opportunity to provide your interpretation.       Stacey Rodriguez.  MD DAISY Gerard/ALEX/petros   D: SK 12/26/2017 1:48 PM   T: PETROS 12/26/2017 4:01 PM         Assessment:     Marshall Bennett is a 62

## 2018-01-15 ENCOUNTER — HOSPITAL ENCOUNTER (OUTPATIENT)
Dept: PHYSICAL THERAPY | Age: 58
Discharge: HOME | End: 2018-01-16
Admitting: ORTHOPAEDIC SURGERY

## 2018-01-15 NOTE — FLOWSHEET NOTE
Left arm     SUBJECTIVE:  Patient is a 62year old male who presents with c/o R hip/hamstring pain with onset around Thanksgiving 2017. Pt had an MRI done 12/26/17 that revealed a R hamstring complex tendon tear involving primarily the semimembranosus tendon but included the common tendon of the biceps femoris and semitendinous to a lesser degree. Primary c/o difficulty pivoting on the R LE and states he as always had difficulty stretching his R leg. Pt. threw a kick about head level with his left leg while pivoting on the R and felt a \"twinge\" in the back of his hip/leg. Pt has had pain ever since. Reports no difficulties with ADLs, he is able to swim and bike and is able to lift things throughout the day without any problems. Currently riding a stationary bike 3x/week ~30 minutes and swimming 6x/week for ~30 minutes. The pain has been getting worse over the past couple weeks making it difficult to sleep: has not been sleeping well and wakes him up at night and is unable to get back to sleep due to the pain. Figure 4 seems to ease the pain when getting to sleep. No relief on either side or on the stomach. No true relief with icy hot. Pt has some back pain that starts central and moves laterally to to the iliac crest. Worsening of symptoms over the past couple weeks and inability to get better since initial injury lead patient to seek further tx. Pt has not had any tx for this to date.      OBJECTIVE: See eval  Observation:   Test measurements:        1/15/18          ROM PROM AROM Comments     Left Right Left Right     Flexion   ** with knee straight [facility] [place] WFL     Extension     [facility] WFL     Abduction     WFL WFL     Adduction     WFL WFL     ER     WFL WFL     IR     WFL WFL           WFL WFL     ** = pain     1/15/18  Flexibility Left Right Comments   Hamstrings WNL 45° painful   ITB (Obers test) - + painful   Hip flexor(Juan Luis test) - - Tight anterior hip bilaterally   gastroc         Rectus femoris(Elys test)   pain  [] Patient limited by other medical complications  [] Other:     Patient education:   1/15/18 - Initial HEP provided to Pt. With good understanding and all questions answered; educated pt on importance of not overstretching his hamstring and to work up to a point of slight discomfort without ever provoking his painful symptoms      Prognosis: [x] Good [] Fair  [] Poor    Patient Requires Follow-up: [x] Yes  [] No    PLAN: See eval  [] Continue per plan of care [] Alter current plan (see comments)  [x] Plan of care initiated [] Hold pending MD visit [] Discharge    Electronically signed by: Therapist was present, directed the patient's care, made skilled judgement, and was responsible for assessment and treatment of the patient.         Serg Dodd, SPT     Krista Huff PT

## 2018-01-15 NOTE — PLAN OF CARE
[]Excellent   [x]Good    []Fair   []Poor    Tolerance of evaluation/treatment:    [x]Excellent   []Good    []Fair   []Poor    Physical Therapy Evaluation Complexity Justification  [x] A history of present problem with:  [x] no personal factors and/or comorbidities that impact the plan of care;  []1-2 personal factors and/or comorbidities that impact the plan of care  []3 personal factors and/or comorbidities that impact the plan of care  [x] An examination of body systems using standardized tests and measures addressing any of the following: body structures and functions (impairments), activity limitations, and/or participation restrictions;:  [] a total of 1-2 or more elements   [x] a total of 3 or more elements   [] a total of 4 or more elements   [x] A clinical presentation with:  [x] stable and/or uncomplicated characteristics   [] evolving clinical presentation with changing characteristics  [] unstable and unpredictable characteristics;   [x] Clinical decision making of [x] low, [] moderate, [] high complexity using standardized patient assessment instrument and/or measurable assessment of functional outcome. [x] EVAL (LOW) 86760 (typically 20 minutes face-to-face)  [] EVAL (MOD) 58825 (typically 30 minutes face-to-face)  [] EVAL (HIGH) 98862 (typically 45 minutes face-to-face)  [] RE-EVAL       PLAN  Frequency/Duration:  1-2 days per week for 12 Weeks:  Interventions:  [x]  Therapeutic exercise including: strength training, ROM, for Lower extremity and core   [x]  NMR activation and proprioception for LE, Glutes and Core   [x]  Manual therapy as indicated for LE, Hip and spine to include: Dry Needling/IASTM, STM, PROM, Gr I-IV mobilizations, manipulation. [x] Modalities as needed that may include: thermal agents, E-stim, Biofeedback, US, iontophoresis as indicated  [x] Patient education on joint protection, postural re-education, activity modification, progression of HEP.     HEP instruction: 1/15/18 - Initial HEP provided to Pt. With good understanding and all questions answered    GOALS:  Patient stated goal[facility] [place] [facility]    Therapist goals for Patient:   Short Term Goals: To be achieved in: 2 weeks  1. Independent in HEP and progression per patient tolerance, in order to prevent re-injury. 2. Patient will have a decrease in pain to facilitate improvement in movement, function, and ADLs as indicated by Functional Deficits. Long Term Goals: To be achieved in: 12 weeks  1. Disability index score of 0% or less for the LEFS to assist with reaching prior level of function. 2. Patient will demonstrate increased hamstring 90/90 flexibility to <25° to allow for proper joint functioning as indicated by patients Functional Deficits. 3. Patient will demonstrate an increase in Strength to good proximal hip strength and control in LE to allow for proper functional mobility as indicated by patients Functional Deficits. 4. [facility] will [facility] [place] [facility] without increased symptoms or restriction. 5. Patient will improve SLS time on his R LE to > 30 seconds to improve his ability to rotate and pivot. Electronically signed by: Therapist was present, directed the patient's care, made skilled judgement, and was responsible for assessment and treatment of the patient.         Sarah Cardenas, SPT     Sadaf oDrado, PT

## 2018-01-17 ENCOUNTER — HOSPITAL ENCOUNTER (OUTPATIENT)
Dept: PHYSICAL THERAPY | Age: 58
Discharge: HOME | End: 2018-01-31

## 2018-01-19 ENCOUNTER — HOSPITAL ENCOUNTER (OUTPATIENT)
Dept: PHYSICAL THERAPY | Age: 58
Discharge: HOME | End: 2018-01-19
Admitting: ORTHOPAEDIC SURGERY

## 2018-01-19 NOTE — FLOWSHEET NOTE
position.     Posture: No glaring abnormalities     Bandages/Dressings/Incisions: none     Gait: decreased stance time on RLE      RESTRICTIONS/PRECAUTIONS: None    Exercises/Interventions:     1/19/2018  ROM/STRETCHES     Seated hamstring  3 x 30\"    Seated piriformis     Supine piriformis 3 x 30\"    Hamstring 90/90 flossing 10 x 10\"    Quad       ITB     Butterfly     Hip flexor stretch kneeling          PREs     SLR 3 x 10; 3# flex, prone  2 x 10; 3# abd    Standing Abduction 1 x 10: GreenTB  bilateral   adduction     Supine abduction with tband     Seated hip flexion with ER     clams 2 x 10; RedTB Watch form/fatigue   SL dead lift     Monster walks     Wall sit with tband     bridges 2 x 20    Staggered 1/2 Lunge 2 10; Bilateral  Supported; half way   Quadruped opposite LE/UE reaches     Seated hip flexion 3 x 10; 3# 5\" eccentric                   Manual interventions     Manual Juan Luis Stretch 5'        Therapeutic Exercise and NMR EXR  [x] (74043) Provided verbal/tactile cueing for activities related to strengthening, flexibility, endurance, ROM for improvements in LE, proximal hip, and core control with self care, mobility, lifting, ambulation.  [] (37393) Provided verbal/tactile cueing for activities related to improving balance, coordination, kinesthetic sense, posture, motor skill, proprioception  to assist with LE, proximal hip, and core control in self care, mobility, lifting, ambulation and eccentric single leg control.      NMR and Therapeutic Activities:    [] (89259 or 85682) Provided verbal/tactile cueing for activities related to improving balance, coordination, kinesthetic sense, posture, motor skill, proprioception and motor activation to allow for proper function of core, proximal hip and LE with self care and ADLs  [] (94884) Gait Re-education- Provided training and instruction to the patient for proper LE, core and proximal hip recruitment and positioning and eccentric body weight control with

## 2018-01-22 ENCOUNTER — HOSPITAL ENCOUNTER (OUTPATIENT)
Dept: PHYSICAL THERAPY | Age: 58
Discharge: HOME | End: 2018-01-22
Admitting: ORTHOPAEDIC SURGERY

## 2018-01-22 NOTE — FLOWSHEET NOTE
(local); Left arm     SUBJECTIVE:  Pt states his hip feels ok. Overall feels good but is actually feeling more discomfort in the front of the hip than in the back of the hip where the injury is supposed to be. Feels that pain in the front when he is getting into more than out of the car. Swam, biked, and golfed over the weekend without any difficulties. Completed his HEP less then he wanted over the weekend due to being busy. OBJECTIVE:  Observation:   Test measurements:      1/15/18          ROM PROM AROM Comments     Left Right Left Right     Flexion   ** with knee straight [facility] [place] WFL     Extension     [facility]     Abduction     WFL WFL     Adduction     WFL WFL     ER     [facility]     IR     WFL WFL           WFL WFL     ** = pain     1/15/18  Flexibility Left Right Comments   Hamstrings WNL 45° painful   ITB (Obers test) - + painful   Hip flexor(Juan Luis test) - - Tight anterior hip bilaterally   gastroc         Rectus femoris(Elys test)         Figure 4 good fair        1/15/18  Special  Test Left Right Comments   FABERS         Scour test         Impingement test         Trendelenburg test (-) (+/-) Initially then able to self correct   SLS >30 s ~10s                  1/15/18  Strength Left Right Comments   Hip flexors 4+/5 4/5     Hip extension 5/5 4/5** Knee straight   Hip abduction 5/5 3+/5     Hip adduction         Hip ER 5/5 5/5     Hip IR 5/5 5/5     Quads 5/5 5/5     Hamstrings 5/5 5/5     ** = painful     Joint mobility:               [x]Normal - Hip                  [x]Hypo - Lumbar spine - L3-5              []Hyper     Palpation: TTP ischial tuberosity, medial hamstring bellies; CPA to L3-L5 hypomobile with reproduction of R sided LBP      Functional Mobility/Transfers:  Independent with transfers;  Functional squat was initially painful with poor form (increased forward weight shift to toes, forward chest, decreased stance width) upon correction of form pt had decreased pain but R sided weight shift educated pt on importance of not overstretching his hamstring and to work up to a point of slight discomfort without ever provoking his painful symptoms  1/19/18 - Updated HEP; discussion regarding form and fatigue; exercise progression education and anticipated recovery timeline  1/22/18 -  Prone quad stretch added to HEP    Prognosis: [x] Good [] Fair  [] Poor    Patient Requires Follow-up: [x] Yes  [] No    PLAN:  [x] Continue per plan of care [] Alter current plan (see comments)  [] Plan of care initiated [] Hold pending MD visit [] Discharge    Core stability   Assess LLD    Electronically signed by: Therapist was present, directed the patient's care, made skilled judgement, and was responsible for assessment and treatment of the patient.         Andie Isbell, SPT     Dash Verma PT

## 2018-01-26 ENCOUNTER — HOSPITAL ENCOUNTER (OUTPATIENT)
Dept: PHYSICAL THERAPY | Age: 58
Discharge: HOME | End: 2018-01-26
Admitting: ORTHOPAEDIC SURGERY

## 2018-01-26 NOTE — FLOWSHEET NOTE
prior level of function. 2. Patient will demonstrate increased hamstring 90/90 flexibility to <25° to allow for proper joint functioning as indicated by patients Functional Deficits. 3. Patient will demonstrate an increase in Strength to good proximal hip strength and control in LE to allow for proper functional mobility as indicated by patients Functional Deficits. 4. Patient will return to [place] [facility] activities without increased symptoms or restriction. 5. Patient will improve SLS time on his R LE to > 30 seconds to improve his ability to rotate and pivot. Progression Towards Functional goals:  [] Patient is progressing as expected towards functional goals listed. [] Progression is slowed due to complexities listed. [] Progression has been slowed due to co-morbidities. [x] Plan just implemented, too soon to assess goals progression  [] Other:     ASSESSMENT: Pt presents with decreased anterior hip discomfort and only has some point pain posteriorly when completing the supine hamstring stretch; encouraged pt to stretch gently to avoid overstretching. Pt with good response to IASTM via palpation and pt response. Pt is a good candidate for dry needling and could benefit from further assessment. Pt with good response to added exercises today. He did demonstrate some contralateral hip drop with SLS on airex, which was slightly improved on level ground. Pt was fatigued following tx today. Pt with good understanding and compliance with HEP and added single limb stance today. .     Treatment/Activity Tolerance:  [x] Patient tolerated treatment well [] Patient limited by fatique  [] Patient limited by pain  [] Patient limited by other medical complications  [] Other:      Patient education:   1/15/18 - Initial HEP provided to Pt.  With good understanding and all questions answered; educated pt on importance of not overstretching his hamstring and to work up to a point of slight discomfort without ever provoking his painful symptoms  1/19/18 - Updated HEP; discussion regarding form and fatigue; exercise progression education and anticipated recovery timeline  1/22/18 -  Prone quad stretch added to HEP  1/26/18 -  SLS added to HEP; educated pt on trigger point dry needling    Prognosis: [x] Good [] Fair  [] Poor    Patient Requires Follow-up: [x] Yes  [] No    PLAN:  [x] Continue per plan of care [] Alter current plan (see comments)  [] Plan of care initiated [] Hold pending MD visit [] Discharge    Core stability   Assess LLD    Electronically signed by: Therapist was present, directed the patient's care, made skilled judgement, and was responsible for assessment and treatment of the patient.         Mayra Sweeney, SPT     Charlotte Herndon PT

## 2018-01-30 ENCOUNTER — HOSPITAL ENCOUNTER (OUTPATIENT)
Dept: PHYSICAL THERAPY | Age: 58
Discharge: HOME | End: 2018-01-30
Admitting: ORTHOPAEDIC SURGERY

## 2018-01-30 NOTE — FLOWSHEET NOTE
posture, motor skill, proprioception  to assist with LE, proximal hip, and core control in self care, mobility, lifting, ambulation and eccentric single leg control. NMR and Therapeutic Activities:    [x] (48151 or 29479) Provided verbal/tactile cueing for activities related to improving balance, coordination, kinesthetic sense, posture, motor skill, proprioception and motor activation to allow for proper function of core, proximal hip and LE with self care and ADLs  [] (89001) Gait Re-education- Provided training and instruction to the patient for proper LE, core and proximal hip recruitment and positioning and eccentric body weight control with ambulation re-education including up and down stairs     Home Exercise Program:    [x] (92952) Reviewed/Progressed HEP activities related to strengthening, flexibility, endurance, ROM of core, proximal hip and LE for functional self-care, mobility, lifting and ambulation/stair navigation   [x] (75275)Reviewed/Progressed HEP activities related to improving balance, coordination, kinesthetic sense, posture, motor skill, proprioception of core, proximal hip and LE for self care, mobility, lifting, and ambulation/stair navigation      Manual Treatments:  PROM / STM / Oscillations-Mobs:  G-I, II, III, IV (PA's, Inf., Post.)  [] (07247) Provided manual therapy to mobilize LE, proximal hip and/or LS spine soft tissue/joints for the purpose of modulating pain, promoting relaxation,  increasing ROM, reducing/eliminating soft tissue swelling/inflammation/restriction, improving soft tissue extensibility and allowing for proper ROM for normal function with self care, mobility, lifting and ambulation.      Modalities:      Charges:  Timed Code Treatment Minutes: 40   Total Treatment Minutes: 60     [] EVAL (LOW) 77516 (typically 20 minutes face-to-face)   [] EVAL (MOD) 58455 (typically 30 minutes face-to-face)  [] EVAL (HIGH) 12541 (typically 45 minutes face-to-face)  [] RE-EVAL

## 2018-02-01 ENCOUNTER — HOSPITAL ENCOUNTER (OUTPATIENT)
Dept: PHYSICAL THERAPY | Age: 58
Discharge: HOME | End: 2018-02-28
Attending: ORTHOPAEDIC SURGERY | Admitting: ORTHOPAEDIC SURGERY

## 2018-02-02 ENCOUNTER — HOSPITAL ENCOUNTER (OUTPATIENT)
Dept: PHYSICAL THERAPY | Age: 58
Discharge: HOME | End: 2018-02-03
Admitting: ORTHOPAEDIC SURGERY

## 2018-02-02 NOTE — FLOWSHEET NOTE
[facility] [employer]  Orthopaedics and Sports Rehabilitation, Jeri Sage    Physical Therapy Daily Treatment Note  Date:  2018    Patient Name:  Bren Kauffman   \"Suleiman\" :  1960  MRN: [id number]  Restrictions/Precautions:    Medical/Treatment Diagnosis Information:  Diagnosis: T98.247O (ICD-10-CM) - Partial hamstring tear, right, subsequent encounter  Treatment Diagnosis: Decreased ROM; Decreased strength; Decreased balance; Decreased functional mobility; leading to inability to perform high level recreational activities. Insurance/Certification information:  PT Insurance Information: PT BENEFITS 2018 FACILITY/ [employer]/ EFFECTIVE 17 ACTIVE/ DED WAIVED/ COPAY 40/ PAYS 100%/ OOP  VPCY/ 0 AUTH/ REIGN REF# [id number]/ 1-15-18 PAG  Physician Information:  Referring Practitioner: Mere Madrigal MD    Plan of care signed (Y/N):     Date of Patient follow up with Physician:     G-Code (if applicable):      Date G-Code Applied:  1/15/18  PT G-Codes  Functional Assessment Tool Used: LEFS  Score: 68/80 = 85% (15% deficit)  Functional Limitation: Mobility: Walking and moving around  Mobility: Walking and Moving Around Current Status (): At least 1 percent but less than 20 percent impaired, limited or restricted  Mobility: Walking and Moving Around Goal Status (): 0 percent impaired, limited or restricted    Progress Note: [x]  Yes  []  No  Next due by: Visit #10       Latex Allergy:  [x]NO      []YES  Preferred Language for Healthcare:   [x]English       []other:    Visit # Insurance Allowable   4 60 VPCY     Pain level:     Easing factors: advill, tiger balm, sitting, figure 4 (R)  Provocative factors: Sleeping, pivoting/kicking combination, squatting (occasionally), cold     Type: [x]Constant           [x]Intermittent      []Radiating         [x]Localized         []other:                Numbness/Tinglin-2x R hip (local);  Left arm     SUBJECTIVE:  Pt reports no change since last

## 2018-02-05 ENCOUNTER — HOSPITAL ENCOUNTER (OUTPATIENT)
Dept: PHYSICAL THERAPY | Age: 58
Discharge: HOME | End: 2018-02-06
Admitting: ORTHOPAEDIC SURGERY

## 2018-02-19 ENCOUNTER — HOSPITAL ENCOUNTER (OUTPATIENT)
Dept: PHYSICAL THERAPY | Age: 58
Discharge: HOME | End: 2018-02-20
Admitting: ORTHOPAEDIC SURGERY

## 2018-02-19 NOTE — FLOWSHEET NOTE
[facility]  Orthopaedics and Sports Rehabilitation, Brett Leyva    Physical Therapy Daily Treatment Note  Date:  2018    Patient Name:  Xuan Rogers   \"Suleiman\" :  1960  MRN: [id number]  Restrictions/Precautions:    Medical/Treatment Diagnosis Information:  Diagnosis: Q75.354O (ICD-10-CM) - Partial hamstring tear, right, subsequent encounter  Treatment Diagnosis: Decreased ROM; Decreased strength; Decreased balance; Decreased functional mobility; leading to inability to perform high level recreational activities. Insurance/Certification information:  PT Insurance Information: PT BENEFITS 2018 FACILITY/ [employer]/ EFFECTIVE 17 ACTIVE/ DED WAIVED/ COPAY 40/ PAYS 100%/ OOP  VPCY/ 0 AUTH/ REIGN REF# [id number]/ 1-15-18 PAG  Physician Information:  Referring Practitioner: Maykel Flores MD    Plan of care signed (Y/N):     Date of Patient follow up with Physician:     G-Code (if applicable):      Date G-Code Applied:  1/15/18  PT G-Codes  Functional Assessment Tool Used: LEFS  Score: 68/80 = 85% (15% deficit)  Functional Limitation: Mobility: Walking and moving around  Mobility: Walking and Moving Around Current Status (): At least 1 percent but less than 20 percent impaired, limited or restricted  Mobility: Walking and Moving Around Goal Status (): 0 percent impaired, limited or restricted    Progress Note: [x]  Yes  []  No  Next due by: Visit #10       Latex Allergy:  [x]NO      []YES  Preferred Language for Healthcare:   [x]English       []other:    Visit # Insurance Allowable   6 60 VPCY     Pain level:     SUBJECTIVE:   Pt has stretching in the HS muscle. No pain at the sit bone with stretching or activity. He does have sit bone discomfort with sitting at work. We discussed positions and possible modifications for the chair. Pt would like to return to some level of TKD.     OBJECTIVE:  Observation:   Test measurements:      1/15/18          ROM PROM AROM Comments     Left Right Left Right     Flexion   ** with knee straight [facility] WFL     Extension     [facility] WFL     Abduction     WFL WFL     Adduction     WFL WFL     ER     [facility] WFL     IR     WFL WFL           WFL WFL     ** = pain     1/15/18  Flexibility Left Right Comments   Hamstrings WNL 45° painful   ITB (Obers test) - + painful   Hip flexor(Juan Luis test) - - Tight anterior hip bilaterally   gastroc         Rectus femoris(Elys test)         Figure 4 good fair        1/15/18  Special  Test Left Right Comments   FABERS         Scour test         Impingement test         Trendelenburg test (-) (+/-) Initially then able to self correct   SLS >30 s ~10s                  1/15/18  Strength Left Right Comments   Hip flexors 4+/5 4/5     Hip extension 5/5 4/5** Knee straight   Hip abduction 5/5 3+/5     Hip adduction         Hip ER 5/5 5/5     Hip IR 5/5 5/5     Quads 5/5 5/5     Hamstrings 5/5 5/5     ** = painful     Palpation: TTP ischial tuberosity, medial hamstring bellies; CPA to L3-L5 hypomobile with reproduction of R sided LBP      RESTRICTIONS/PRECAUTIONS: None    Exercises/Interventions:     2/19/2018  ROM/STRETCHES     Seated hamstring  5 x 30\" Cue for gentle stretch   Seated piriformis     Supine piriformis     Hamstring 90/90 flossing     Quad - prone      ITB     Incline stretch [id number]    Hip flexor stretch kneeling          PREs     SLR       Standing Abduction     adduction     Supine abduction with tband     Seated hip flexion with ER     clams     SL dead lift     Lateral stepping     Wall sit with tband     Bridges  HS curl with ball/bridge   3x10   Blue   SL dead lift     Quadruped opposite LE/UE reaches     Seated hip flexionStanding knee flexion      Leg Press  Ecc HS curl  Leg extension 120#  3x10  40#  3x10  35#  3x10    Single limb stance 5 x 30\" airex    Bike Seat 7 3.0 x 5 min    Manual interventions Manual Juan Luis Stretch    L1-L5 CPA  STM medial hamstring  IASTM - medial hamstring       Therapeutic Exercise and NMR EXR  [x] (02911) Provided verbal/tactile cueing for activities related to strengthening, flexibility, endurance, ROM for improvements in LE, proximal hip, and core control with self care, mobility, lifting, ambulation.  [] (60200) Provided verbal/tactile cueing for activities related to improving balance, coordination, kinesthetic sense, posture, motor skill, proprioception  to assist with LE, proximal hip, and core control in self care, mobility, lifting, ambulation and eccentric single leg control.      NMR and Therapeutic Activities:    [x] (72809 or 06054) Provided verbal/tactile cueing for activities related to improving balance, coordination, kinesthetic sense, posture, motor skill, proprioception and motor activation to allow for proper function of core, proximal hip and LE with self care and ADLs  [] (07883) Gait Re-education- Provided training and instruction to the patient for proper LE, core and proximal hip recruitment and positioning and eccentric body weight control with ambulation re-education including up and down stairs     Home Exercise Program:    [x] (58173) Reviewed/Progressed HEP activities related to strengthening, flexibility, endurance, ROM of core, proximal hip and LE for functional self-care, mobility, lifting and ambulation/stair navigation   [x] (52228)Reviewed/Progressed HEP activities related to improving balance, coordination, kinesthetic sense, posture, motor skill, proprioception of core, proximal hip and LE for self care, mobility, lifting, and ambulation/stair navigation      Manual Treatments:  PROM / STM / Oscillations-Mobs:  G-I, II, III, IV (PA's, Inf., Post.)  [] (39133) Provided manual therapy to mobilize LE, proximal hip and/or LS spine soft tissue/joints for the purpose of modulating pain, promoting relaxation,  increasing ROM, reducing/eliminating soft tissue swelling/inflammation/restriction, improving soft tissue extensibility and allowing for proper ROM for normal function with self care, mobility, lifting and ambulation. Modalities:     Charges:  Timed Code Treatment Minutes: 40   Total Treatment Minutes: 60     [] EVAL (LOW) 16662 (typically 20 minutes face-to-face)   [] EVAL (MOD) 69023 (typically 30 minutes face-to-face)  [] EVAL (HIGH) 76526 (typically 45 minutes face-to-face)  [] RE-EVAL     [x] LIO(19138) x  2   [] IONTO  [x] NMR (27638) x  1   [] VASO  [] Manual (49889) x       [] Other:  [] TA x       [] Mech Traction (33267)  [] ES(attended) (36145)      [] ES (un) (23236):     GOALS:   Patient stated goal: Return to [employer] painfree     Therapist goals for Patient:   Short Term Goals: To be achieved in: 2 weeks  1. Independent in HEP and progression per patient tolerance, in order to prevent re-injury. 2. Patient will have a decrease in pain to facilitate improvement in movement, function, and ADLs as indicated by Functional Deficits.     Long Term Goals: To be achieved in: 12 weeks  1. Disability index score of 0% or less for the LEFS to assist with reaching prior level of function. 2. Patient will demonstrate increased hamstring 90/90 flexibility to <25° to allow for proper joint functioning as indicated by patients Functional Deficits. 3. Patient will demonstrate an increase in Strength to good proximal hip strength and control in LE to allow for proper functional mobility as indicated by patients Functional Deficits. 4. Patient will return to [employer] activities without increased symptoms or restriction. 5. Patient will improve SLS time on his R LE to > 30 seconds to improve his ability to rotate and pivot. Progression Towards Functional goals:  [x] Patient is progressing as expected towards functional goals listed. [] Progression is slowed due to complexities listed. [] Progression has been slowed due to co-morbidities.   [] Plan just implemented, too soon to assess goals progression  [] Other:     ASSESSMENT:  Pt is feeling better and having less pain. He notes discomfort at the sit bone with sitting at work. He performs the strengthening and balance exercises well. Pt is ready to start work on skills for return to TKD. Pivot and side kick are the motion that caused the injury. We will evaluate that movement for height of the kick and position. Treatment/Activity Tolerance:  [x] Patient tolerated treatment well [] Patient limited by fatique  [] Patient limited by pain  [] Patient limited by other medical complications  [] Other:      Patient education:     Prognosis: [x] Good [] Fair  [] Poor    Patient Requires Follow-up: [x] Yes  [] No    PLAN: strengthening, endurance, proprioception training.    [x] Continue per plan of care [] Alter current plan (see comments)  [] Plan of care initiated [] Hold pending MD visit [] Discharge    Electronically signed by:   Sasha Villaseñor PT

## 2018-02-26 ENCOUNTER — HOSPITAL ENCOUNTER (OUTPATIENT)
Dept: PHYSICAL THERAPY | Age: 58
Discharge: HOME | End: 2018-02-27
Admitting: ORTHOPAEDIC SURGERY

## 2018-02-26 NOTE — FLOWSHEET NOTE
ER     [facility] WFL     IR     [facility] WFL           WFL WFL     ** = pain     1/15/18  Flexibility Left Right Comments   Hamstrings WNL 45° painful   ITB (Obers test) - + painful   Hip flexor(Juan Luis test) - - Tight anterior hip bilaterally   gastroc         Rectus femoris(Elys test)         Figure 4 good fair        1/15/18  Special  Test Left Right Comments   FABERS         Scour test         Impingement test         Trendelenburg test (-) (+/-) Initially then able to self correct   SLS >30 s ~10s                  1/15/18  Strength Left Right Comments   Hip flexors 4+/5 4/5     Hip extension 5/5 4/5** Knee straight   Hip abduction 5/5 3+/5     Hip adduction         Hip ER 5/5 5/5     Hip IR 5/5 5/5     Quads 5/5 5/5     Hamstrings 5/5 5/5     ** = painful     Palpation: TTP ischial tuberosity, medial hamstring bellies; CPA to L3-L5 hypomobile with reproduction of R sided LBP      RESTRICTIONS/PRECAUTIONS: None    Exercises/Interventions:     2/26/2018  ROM/STRETCHES     Seated hamstring  5 x 30\"    Seated piriformis     Supine piriformis     Hamstring 90/90 flossing     Quad - prone      ITB     Incline stretch [id number]    Hip flexor stretch kneeling          PREs     SLR       Standing Abduction     adduction     Supine abduction with tband     Seated hip flexion with ER     clams     SL plyo 3x10  4-7# ball    Lateral stepping     Wall sit with tband     Bridges  HS curl with ball/bridge   3x10   Blue   SL dead lift     Quadruped opposite LE/UE reaches     Seated hip flexionStanding knee flexion      Leg Press  Ecc HS curl  Leg extension 125#  3x10  45/45/40#  3x10  40#  3x10    Single limb stance     Bike Seat 7 3.0 x 5 min    Manual interventions Manual Juan Luis Stretch    L1-L5 CPA  STM medial hamstring  IASTM - medial hamstring   **Reviewed kicking technique for TKD.       Therapeutic Exercise and NMR EXR  [x] (64689) Provided verbal/tactile cueing for activities related to strengthening, flexibility, endurance, 45   Total Treatment Minutes: 60     [] EVAL (LOW) 67995 (typically 20 minutes face-to-face)   [] EVAL (MOD) 30788 (typically 30 minutes face-to-face)  [] EVAL (HIGH) 91076 (typically 45 minutes face-to-face)  [] RE-EVAL     [x] FM(74276) x  1   [] IONTO  [x] NMR (56606) x  1   [] VASO  [] Manual (69485) x       [] Other:  [x] TA x  1    [] Mech Traction (56990)  [] ES(attended) (37688)      [] ES (un) (54772):     GOALS:   Patient stated goal: Return to [facility] painfree     Therapist goals for Patient:   Short Term Goals: To be achieved in: 2 weeks  1. Independent in HEP and progression per patient tolerance, in order to prevent re-injury. 2. Patient will have a decrease in pain to facilitate improvement in movement, function, and ADLs as indicated by Functional Deficits.     Long Term Goals: To be achieved in: 12 weeks  1. Disability index score of 0% or less for the LEFS to assist with reaching prior level of function. 2. Patient will demonstrate increased hamstring 90/90 flexibility to <25° to allow for proper joint functioning as indicated by patients Functional Deficits. 3. Patient will demonstrate an increase in Strength to good proximal hip strength and control in LE to allow for proper functional mobility as indicated by patients Functional Deficits. 4. Patient will return to [employer] activities without increased symptoms or restriction. 5. Patient will improve SLS time on his R LE to > 30 seconds to improve his ability to rotate and pivot. Progression Towards Functional goals:  [x] Patient is progressing as expected towards functional goals listed. [] Progression is slowed due to complexities listed. [] Progression has been slowed due to co-morbidities. [] Plan just implemented, too soon to assess goals progression  [] Other:     ASSESSMENT:   Pt has pain at the ischial tuberosity with sitting position. He is doing well with the therapy.  He is concerned about decreased HS

## 2018-03-01 ENCOUNTER — HOSPITAL ENCOUNTER (OUTPATIENT)
Dept: PHYSICAL THERAPY | Age: 58
Discharge: HOME | End: 2018-03-31
Attending: ORTHOPAEDIC SURGERY | Admitting: ORTHOPAEDIC SURGERY

## 2018-03-23 ENCOUNTER — HOSPITAL ENCOUNTER (OUTPATIENT)
Dept: PHYSICAL THERAPY | Age: 58
Discharge: HOME | End: 2018-03-24
Admitting: ORTHOPAEDIC SURGERY

## 2018-03-23 NOTE — FLOWSHEET NOTE
[facility]    Physical Therapy  Cancellation/No-show Note  Patient Name:  Paty Otto  :  1960   Date:  3/23/2018  Cancelled visits to date: 0  No-shows to date: 1    For today's appointment patient:  [x]  Cancelled  []  Rescheduled appointment  []  No-show     Reason given by patient:  []  Patient ill  []  Conflicting appointment   []  No transportation    [x]  Conflict with work  []  No reason given  []  Other:     Comments:      Electronically signed by:  Madelin Tolentino PT

## 2019-04-15 ENCOUNTER — OFFICE VISIT (OUTPATIENT)
Dept: ORTHOPEDIC SURGERY | Age: 59
End: 2019-04-15
Payer: COMMERCIAL

## 2019-04-15 VITALS
DIASTOLIC BLOOD PRESSURE: 82 MMHG | SYSTOLIC BLOOD PRESSURE: 136 MMHG | HEIGHT: 66 IN | WEIGHT: 155 LBS | HEART RATE: 68 BPM | BODY MASS INDEX: 24.91 KG/M2

## 2019-04-15 DIAGNOSIS — M75.21 BICEPS TENDINITIS OF RIGHT UPPER EXTREMITY: ICD-10-CM

## 2019-04-15 DIAGNOSIS — M75.81 ROTATOR CUFF TENDONITIS, RIGHT: ICD-10-CM

## 2019-04-15 DIAGNOSIS — M25.511 RIGHT SHOULDER PAIN, UNSPECIFIED CHRONICITY: Primary | ICD-10-CM

## 2019-04-15 DIAGNOSIS — M19.011 ARTHRITIS OF RIGHT ACROMIOCLAVICULAR JOINT: ICD-10-CM

## 2019-04-15 PROCEDURE — 20610 DRAIN/INJ JOINT/BURSA W/O US: CPT | Performed by: ORTHOPAEDIC SURGERY

## 2019-04-15 PROCEDURE — 99214 OFFICE O/P EST MOD 30 MIN: CPT | Performed by: ORTHOPAEDIC SURGERY

## 2019-04-15 NOTE — PROGRESS NOTES
History of Present Illness:  Jessica Chacon is a 62 y.o. male RHD  presenting for evaluation of right shoulder pain. Pain radiates down the anterior arm and worse with lifting activities. Patient has some pain at night as well. He denies any clicking popping or catching within the shoulder. Pain is also worse with overhead activities. Patient was also seen previously several years ago for a SLAP tear in the right shoulder. Medical History:  Patient's medications, allergies, past medical, surgical, social and family histories were reviewed and updated as appropriate. Pain Assessment  Location of Pain: Shoulder  Location Modifiers: Right  Severity of Pain: 5  Quality of Pain: Dull  Duration of Pain: Persistent  Frequency of Pain: Constant  Aggravating Factors: (lifting)  Limiting Behavior: Some  Relieving Factors: Rest, Nsaids  Result of Injury: No  Work-Related Injury: No  Are there other pain locations you wish to document?: No    Pertinent items are noted in HPI  Review of systems reviewed from Patient History Form is available in the patient's chart under the Media tab. Vital Signs:  Vitals:    04/15/19 1601   BP: 136/82   Pulse: 68         Neuro: Alert & oriented x 3,  normal,  no focal deficits noted. Normal affect. Eyes: sclera clear  Ears: Normal external ear  Mouth:  No perioral lesions  Pulm: Respirations unlabored and regular  Pulse: Regular rate and rhythm   Skin: Warm, well perfused      Constitutional: The physical examination finds the patient to be well-developed and well-nourished. The patient is alert and oriented x3 and was cooperative throughout the visit. Neuro: alert and oriented  Eyes: Sclera clear  Ears: Normal external ear  Mouth: No perioral lesions  Pulm: Respirations unlabored and regular  Skin: Warm, well perfused        Right Shoulder Examination:    Inspection:  Held in a normal posture. Normal contour at the acromioclavicular joint with a superior spur.  No swelling, ecchymosis, or erythema about the shoulder. No atrophy appreciated. Palpation:  No subacromial crepitus. No clicking or popping with crank exam but some pain is elicited. Tender over bicipital groove and anterior cuff. Range of Motion: Full passive and active ROM. Normal scapulothoracic rhythm. Strength:  Normal supraspinatus, infraspinatus, and subscapularis muscle strength. Stability: No anterior instability. No posterior instability. Special Tests:  Crossover sign is positive over biceps. Belly press sign is negative. Lift off sign is negative. Impingement findings are positive. Labral findings are negative for anterior and posterior instability. Speed sign and Yergason signs are both positive along with Henderson's. Other findings: The skin is warm dry and well perfused. 2+ radial pulse. Sensation is intact to light touch over the deltoid. Comparison Left Shoulder Examination:    Inspection:  Held in a normal posture. Normal contour at the acromioclavicular joint. No swelling, ecchymosis, or erythema about the shoulder. No atrophy appreciated. Palpation:  No subacromial crepitus. Range of Motion: Full passive and active ROM. Normal scapulothoracic rhythm. Strength:  Normal supraspinatus, infraspinatus, and subscapularis muscle strength. Stability: No anterior instability. No posterior instability. Special Tests:  Crossover sign is negative. Belly press sign is negative. Lift off sign is negative. Impingement findings are negative. Labral findings are negative. Speed sign and Yergason signs are both negative. Other findings: The skin is warm dry and well perfused. 2+ radial pulse. Sensation is intact to light touch over the deltoid. Radiology:     AP, scapular Y, axillary lateral of the right shoulder demonstrates advanced a.c. joint arthrosis but no fracture or dislocation.     Impression: Mild degenerative changes right shoulder         Assessment :

## 2019-06-17 ENCOUNTER — OFFICE VISIT (OUTPATIENT)
Dept: ORTHOPEDIC SURGERY | Age: 59
End: 2019-06-17
Payer: COMMERCIAL

## 2019-06-17 VITALS
HEIGHT: 66 IN | DIASTOLIC BLOOD PRESSURE: 79 MMHG | WEIGHT: 155 LBS | SYSTOLIC BLOOD PRESSURE: 129 MMHG | HEART RATE: 86 BPM | BODY MASS INDEX: 24.91 KG/M2

## 2019-06-17 DIAGNOSIS — M75.81 ROTATOR CUFF TENDONITIS, RIGHT: ICD-10-CM

## 2019-06-17 DIAGNOSIS — M19.011 ARTHRITIS OF RIGHT ACROMIOCLAVICULAR JOINT: ICD-10-CM

## 2019-06-17 DIAGNOSIS — M75.21 BICEPS TENDINITIS OF RIGHT UPPER EXTREMITY: Primary | ICD-10-CM

## 2019-06-17 PROCEDURE — 99213 OFFICE O/P EST LOW 20 MIN: CPT | Performed by: ORTHOPAEDIC SURGERY

## 2019-06-17 NOTE — PROGRESS NOTES
History of Present Illness:  Faviola De Leon is a 62 y.o. male RHD  presenting for re-evaluation of right shoulder pain. Pain radiates down the shoulder and worse with lifting activities. Patient has some pain at night as well. He denies any clicking popping or catching within the shoulder. Pain is also worse with overhead activities. Patient was also seen previously several years ago for a SLAP tear in the right shoulder. His diagnosis his last appointment was biceps tendinitis, rotator cuff tendinitis, and a SLAP tear. He had an injection at his last appointment 4/15/2019 he reports that the injection helped for about a week and then the effects wore off. He has tried to do his exercise but continues to have pain. He has been taking some Advil as needed and trying to moderate his activities. Medical History:  Patient's medications, allergies, past medical, surgical, social and family histories were reviewed and updated as appropriate. Pain Assessment  Location of Pain: Shoulder  Location Modifiers: Right  Severity of Pain: 5  Quality of Pain: Dull, Aching  Duration of Pain: Persistent  Frequency of Pain: Constant  Aggravating Factors: (certain movement / lifting )  Limiting Behavior: Yes  Relieving Factors: Rest  Result of Injury: No  Work-Related Injury: No  Are there other pain locations you wish to document?: No    Pertinent items are noted in HPI  Review of systems reviewed from Patient History Form is available in the patient's chart under the Media tab. Vital Signs:  Vitals:    06/17/19 0857   BP: 129/79   Pulse: 86         Neuro: Alert & oriented x 3,  normal,  no focal deficits noted. Normal affect. Eyes: sclera clear  Ears: Normal external ear  Mouth:  No perioral lesions  Pulm: Respirations unlabored and regular  Pulse: Regular rate and rhythm   Skin: Warm, well perfused      Constitutional: The physical examination finds the patient to be well-developed and well-nourished. The patient is alert and oriented x3 and was cooperative throughout the visit. Neuro: alert and oriented  Eyes: Sclera clear  Ears: Normal external ear  Mouth: No perioral lesions  Pulm: Respirations unlabored and regular  Skin: Warm, well perfused        Right Shoulder Examination:    Inspection:  Held in a normal posture. Normal contour at the acromioclavicular joint with a superior spur. No swelling, ecchymosis, or erythema about the shoulder. No atrophy appreciated. Palpation:  No subacromial crepitus. No clicking or popping with crank exam but some pain is elicited. Tender over bicipital groove and anterior cuff. He also has tenderness over the posterior joint line. Range of Motion: Full passive and active ROM. Normal scapulothoracic rhythm. Strength:  Normal supraspinatus, infraspinatus, and subscapularis muscle strength. Stability: No anterior instability. No posterior instability. Special Tests:  Crossover sign is positive over biceps. Belly press sign is negative. Lift off sign is negative. Impingement findings are positive. Labral findings are negative for anterior and posterior instability. Speed sign and Yergason signs are both positive along with Henderson's. Other findings: The skin is warm dry and well perfused. 2+ radial pulse. Sensation is intact to light touch over the deltoid. Comparison Left Shoulder Examination:    Inspection:  Held in a normal posture. Normal contour at the acromioclavicular joint. No swelling, ecchymosis, or erythema about the shoulder. No atrophy appreciated. Palpation:  No subacromial crepitus. Range of Motion: Full passive and active ROM. Normal scapulothoracic rhythm. Strength:  Normal supraspinatus, infraspinatus, and subscapularis muscle strength. Stability: No anterior instability. No posterior instability. Special Tests:  Crossover sign is negative. Belly press sign is negative. Lift off sign is negative.  Impingement findings are negative. Labral findings are negative. Speed sign and Yergason signs are both negative. Other findings: The skin is warm dry and well perfused. 2+ radial pulse. Sensation is intact to light touch over the deltoid. Radiology:     No new x-rays at this visit. Assessment : 26-year-old male with biceps tendonosis, superior labral tear, and rotator cuff tendonitis. Impression:  Encounter Diagnoses   Name Primary?  Biceps tendinitis of right upper extremity Yes    Arthritis of right acromioclavicular joint     Rotator cuff tendonitis, right        Office Procedures:  Orders Placed This Encounter   Procedures    MRI SHOULDER RIGHT WO CONTRAST     Standing Status:   Future     Standing Expiration Date:   6/17/2020     Order Specific Question:   Reason for exam:     Answer:   MRI R SHOULDER W/3D  R/O RCT     Order Specific Question:   Reason for exam:     Answer:   Kerwin Leavitt 6/19           Plan:   Patient is a very pleasant 26-year-old male is seen today for his right shoulder. His injection last time gave him a week's worth of relief but he continues to have symptoms despite conservative treatment measures. We would like to obtain an MRI to further look at his rotator cuff, biceps tendon, and his labrum. We would see him back after this exam is complete. We discussed the possible need for surgery. 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 But he would like to figure out what is going on inside his shoulder and it is reasonable to obtain the MRI. His questions were sought and answered today and he is agreement the plan moving forward. Fabiano Roberts IV, D.O.    Clinical Fellow, [address] [place] [address]  Date:    6/17/2019      The encounter with Jose Lomax was supervised by Dr. Rosenda Gunter, who personally examined the patient and reviewed the plan. This dictation was performed with a verbal recognition program (DRAGON) and it was checked for errors. It is possible that there are still dictated errors within this office note. If so, please bring any errors to my attention for an addendum. All efforts were made to ensure that this office note is accurate. I supervised my sports medicine fellow in the evaluation and development of a treatment plan  for this patient. I personally interviewed the patient and performed the physical examination. In addition, I discussed the diagnosis and treatment options. All of the patient's questions were answered. I personally reviewed the patient's pain scale, review of systems, family history, social history, past medical history, allergies and medications. Review of systems was collected today, reviewed and is included in the medical record. It is available under the media tab. Rohit Jj MD  Sports Medicine, Arthroscopic Knee and Shoulder Surgery    This dictation was performed with a verbal recognition program [facility] ) and it was checked for errors. It is possible that there are still dictated errors within this office note. If so, please bring any errors to my attention for an addendum. All efforts were made to ensure that this office note is accurate.

## 2019-06-17 NOTE — PROGRESS NOTES
Smoking status: Former Smoker    Smokeless tobacco: Never Used    Tobacco comment: QUIT 1984 AFTER SMOKING A FEW YEARS   Substance and Sexual Activity    Alcohol use: Yes     Alcohol/week: 3.6 oz     Types: 6 Cans of beer per week     Comment: ON WEEKENDS    Drug use: No    Sexual activity: None   Lifestyle    Physical activity:     Days per week: None     Minutes per session: None    Stress: None   Relationships    Social connections:     Talks on phone: None     Gets together: None     Attends Synagogue service: None     Active member of club or organization: None     Attends meetings of clubs or organizations: None     Relationship status: None    Intimate partner violence:     Fear of current or ex partner: None     Emotionally abused: None     Physically abused: None     Forced sexual activity: None   Other Topics Concern    None   Social History Narrative    None       Current Outpatient Medications   Medication Sig Dispense Refill    atorvastatin (LIPITOR) 20 MG tablet Take 20 mg by mouth      methylPREDNISolone (MEDROL DOSEPACK) 4 MG tablet       VIAGRA 50 MG tablet       ibuprofen (ADVIL;MOTRIN) 800 MG tablet Take 1 tablet by mouth every 8 hours as needed for Pain 30 tablet 0    methocarbamol (ROBAXIN) 500 MG tablet Take 1 tablet by mouth 4 times daily as needed May take 1-3 pills, do not exceed 16 pills in a day. 40 tablet 0    oxyCODONE-acetaminophen (PERCOCET) 5-325 MG per tablet 1-2 pills every 4 hours as needed for pain. 15 tablet 0    atorvastatin (LIPITOR) 20 MG tablet Take 20 mg by mouth daily.  aspirin 81 MG tablet Take 81 mg by mouth daily. No current facility-administered medications for this visit. Allergies   Allergen Reactions    Codeine Other (See Comments)     Mood altering. Causes pt to become mean, short tempered.     Sulfa Antibiotics        Vital signs:  /79   Pulse 86   Ht 5' 6\" (1.676 m)   Wt 155 lb (70.3 kg)   BMI 25.02 kg/m² negative. Other findings: The skin is warm dry and well perfused. Distally neurovascularly intact. Sensation is intact to light touch over the deltoid. Radiology:     Pertinent imaging reviewed. No new imaging was obtained during today's visit. Assessment :  ***    Impression:  Encounter Diagnoses   Name Primary?  Biceps tendinitis of right upper extremity Yes    Arthritis of right acromioclavicular joint     Rotator cuff tendonitis, right        Office Procedures:  No orders of the defined types were placed in this encounter. Plan: Pertinent imaging was reviewed. The etiology, natural history, and treatment options for the disorder were discussed. The roles of activity medication, antiinflammatories, injections, bracing, physical therapy, and surgical interventions were all described to the patient and questions were answered. ***.     *** Chico Sanchez is in agreement with this plan. All questions were answered to patient's satisfaction and was encouraged to call with any further questions. Bereket Schofield ATC, am scribing for and in the presence of Dr. Negar Brasher.    06/17/19 9:05 AM Wyn Nissen, ATC

## 2019-06-21 ENCOUNTER — OFFICE VISIT (OUTPATIENT)
Dept: ORTHOPEDIC SURGERY | Age: 59
End: 2019-06-21
Payer: COMMERCIAL

## 2019-06-21 VITALS
BODY MASS INDEX: 24.91 KG/M2 | WEIGHT: 155 LBS | DIASTOLIC BLOOD PRESSURE: 81 MMHG | SYSTOLIC BLOOD PRESSURE: 125 MMHG | HEIGHT: 66 IN | HEART RATE: 88 BPM

## 2019-06-21 DIAGNOSIS — S43.431A SUPERIOR LABRUM ANTERIOR-TO-POSTERIOR (SLAP) TEAR OF RIGHT SHOULDER: ICD-10-CM

## 2019-06-21 DIAGNOSIS — M75.81 ROTATOR CUFF TENDONITIS, RIGHT: ICD-10-CM

## 2019-06-21 DIAGNOSIS — M75.21 BICEPS TENDONITIS ON RIGHT: Primary | ICD-10-CM

## 2019-06-21 PROCEDURE — 20550 NJX 1 TENDON SHEATH/LIGAMENT: CPT | Performed by: ORTHOPAEDIC SURGERY

## 2019-06-21 PROCEDURE — 99213 OFFICE O/P EST LOW 20 MIN: CPT | Performed by: ORTHOPAEDIC SURGERY

## 2019-06-21 NOTE — PROGRESS NOTES
Cortisone injection: right shoulder     2cc depo medrol 40mg    PUB:[id number]  Exp:02/2021  Ndc:5384-9578-77    4cc lidocaine 1%    Lot:-[id number]  Exp:11/1/2020  Ndc: 2047-7742-41

## 2019-06-21 NOTE — PROGRESS NOTES
History of Present Illness:  Jessica Chacon is a 62 y.o. male presenting for repeat evaluation regarding right shoulder pain. Patient is a right-hand-dominant  and notices it more when he is lifting heavy objects. He localizes a deep within the shoulder. He reports no interval changes since his last visit. He has been performing home exercise program but is unable to perform all of the exercises due to time constraints. He received a corticosteroid injection from the posterior approach which only provided temporary relief. He received an MRI in the interval and presents today for review    Medical History:  Patient's medications, allergies, past medical, surgical, social and family histories were reviewed and updated as appropriate. Pain Assessment  Location of Pain: Shoulder  Location Modifiers: Right  Severity of Pain: 5  Quality of Pain: Aching  Duration of Pain: Persistent  Frequency of Pain: Intermittent  Aggravating Factors: (certain movement )  Limiting Behavior: Yes  Relieving Factors: Rest  Result of Injury: No  Work-Related Injury: No  Are there other pain locations you wish to document?: No    Pertinent items are noted in HPI  Review of systems reviewed from Patient History Form dated isavailable in the patient's chart under the Media tab. Vital Signs:  Vitals:    06/21/19 0858   BP: 125/81   Pulse: 88         Neuro: Alert & oriented x 3,  normal,  no focal deficits noted. Normal affect. Eyes: sclera clear  Ears: Normal external ear  Mouth:  No perioral lesions  Pulm: Respirations unlabored and regular  Pulse: Regular rate and rhythm   Skin: Warm, well perfused      Constitutional: The physical examination finds the patient to be well-developed and well-nourished. The patient is alert and oriented x3 and was cooperative throughout the visit.     Neuro: alert and oriented  Eyes: Sclera clear  Ears: Normal external ear  Mouth: No perioral lesions  Pulm: Respirations unlabored and regular  Skin: Warm, well perfused        Right shoulder Examination:    Inspection:  Held in a normal posture. Normal contour at the acromioclavicular joint. No swelling, ecchymosis, or erythema about the shoulder. No atrophy appreciated. Palpation:  No subacromial crepitus. There is occasional clicking with dynamic shear that is nonpainful. He has tenderness over the bicipital groove. Mild tenderness over the rotator cuff footprint proper    Range of Motion: Full passive and active ROM. Normal scapulothoracic rhythm. Strength:  Normal supraspinatus, infraspinatus, and subscapularis muscle strength. Stability: No anterior instability. No posterior instability. Special Tests:  Crossover sign is positive over the biceps. Belly press sign is negative. Lift off sign is negative. Impingement findings are negative. Labral findings are negative. Speed sign and Yergason signs are both positive. O'Briens sign is positive    Other findings: The skin is warm dry and well perfused. 2+ radial pulse. Sensation is intact to light touch over the deltoid. Comparison left shoulder Examination:    Inspection:  Held in a normal posture. Normal contour at the acromioclavicular joint. No swelling, ecchymosis, or erythema about the shoulder. No atrophy appreciated. Palpation:  No subacromial crepitus. Range of Motion: Full passive and active ROM. Normal scapulothoracic rhythm. Strength:  Normal supraspinatus, infraspinatus, and subscapularis muscle strength. Stability: No anterior instability. No posterior instability. Special Tests:  Crossover sign is negative. Belly press sign is negative. Lift off sign is negative. Impingement findings are negative. Labral findings are negative. Speed sign and Yergason signs are both negative. Other findings: The skin is warm dry and well perfused. 2+ radial pulse. Sensation is intact to light touch over the deltoid.           Radiology:       Multisequence multiplanar MRI of the right shoulder without intra-articular contrast demonstrates some mild rotator cuff tendinitis particularly of the supraspinatus tendon as well as fluid over the biceps and an appreciable SLAP tear. Mild degenerative changes otherwise    Impression: Rotator cuff tendinitis, SLAP tear, biceps tendinopathy         Assessment : Rotator cuff tendinitis, SLAP tear, biceps tendinopathy    Impression:  Encounter Diagnoses   Name Primary?  Biceps tendonitis on right Yes    Superior labrum anterior-to-posterior (SLAP) tear of right shoulder     Rotator cuff tendonitis, right        Office Procedures:  Orders Placed This Encounter   Procedures    [facility]     Referral Priority:   Routine     Referral Type:   Eval and Treat     Referral Reason:   Specialty Services Required     Requested Specialty:   Physical Therapy     Number of Visits Requested:   1           Plan: Recommend continuing with dedicated physical therapy for 1-2 visits so that he can have a specific exercise program that works for him. In addition we recommend anti-inflammatory medications but we will proceed with a dedicated biceps injection with corticosteroid today. She reported relief of his symptoms thereafter. Salena Tamez is in agreement with this plan. All questions were answered to patient's satisfaction and was encouraged to call with any further questions. Procedure: Risks and benefits of a corticosteroid injection were discussed with Shayna Phan. 80 milligrams of Depo Medrol and 8 CC of 1% lidocaine were injected in the bicipital groove after the point of maximal tenderness was palpated. The skin was sterilized with chlorhexidine prep. He  tolerated the procedure well with no immediate adverse sequelae after the injection.        Hiral Samson MD  Fellow  [address] [place] [address]  Date: 6/21/2019    This dictation was performed with a verbal recognition program (DRAGON) and it was checked for errors. It is possible that there are still dictated errors within this office note. If so, please bring any errors to my attention for an addendum. All efforts were made to ensure that this office note is accurate. I supervised my sports medicine fellow in the evaluation and development of a treatment plan  for this patient. I personally interviewed the patient and performed the physical examination. In addition, I discussed the diagnosis and treatment options. All of the patient's questions were answered. I personally reviewed the patient's pain scale, review of systems, family history, social history, past medical history, allergies and medications. Review of systems was collected today, reviewed and is included in the medical record. It is available under the media tab. Diamond Kaye MD  Sports Medicine, Arthroscopic Knee and Shoulder Surgery    This dictation was performed with a verbal recognition program [facility]) and it was checked for errors. It is possible that there are still dictated errors within this office note. If so, please bring any errors to my attention for an addendum. All efforts were made to ensure that this office note is accurate.

## 2024-03-19 NOTE — FLOWSHEET NOTE
Left another message   care, mobility, lifting, ambulation.  [] (49538) Provided verbal/tactile cueing for activities related to improving balance, coordination, kinesthetic sense, posture, motor skill, proprioception  to assist with LE, proximal hip, and core control in self care, mobility, lifting, ambulation and eccentric single leg control. NMR and Therapeutic Activities:    [x] (35444 or 75642) Provided verbal/tactile cueing for activities related to improving balance, coordination, kinesthetic sense, posture, motor skill, proprioception and motor activation to allow for proper function of core, proximal hip and LE with self care and ADLs  [] (41520) Gait Re-education- Provided training and instruction to the patient for proper LE, core and proximal hip recruitment and positioning and eccentric body weight control with ambulation re-education including up and down stairs     Home Exercise Program:    [x] (19991) Reviewed/Progressed HEP activities related to strengthening, flexibility, endurance, ROM of core, proximal hip and LE for functional self-care, mobility, lifting and ambulation/stair navigation   [x] (14487)Reviewed/Progressed HEP activities related to improving balance, coordination, kinesthetic sense, posture, motor skill, proprioception of core, proximal hip and LE for self care, mobility, lifting, and ambulation/stair navigation      Manual Treatments:  PROM / STM / Oscillations-Mobs:  G-I, II, III, IV (PA's, Inf., Post.)  [] (02092) Provided manual therapy to mobilize LE, proximal hip and/or LS spine soft tissue/joints for the purpose of modulating pain, promoting relaxation,  increasing ROM, reducing/eliminating soft tissue swelling/inflammation/restriction, improving soft tissue extensibility and allowing for proper ROM for normal function with self care, mobility, lifting and ambulation.      Modalities:     Charges:  Timed Code Treatment Minutes: 40   Total Treatment Minutes: 60     [] EVAL (LOW) 51118 (typically tolerated treatment well [] Patient limited by fatique  [] Patient limited by pain  [] Patient limited by other medical complications  [] Other:      Patient education:     Prognosis: [x] Good [] Fair  [] Poor    Patient Requires Follow-up: [x] Yes  [] No    PLAN: strengthening, endurance, proprioception training.    [x] Continue per plan of care [] Alter current plan (see comments)  [] Plan of care initiated [] Hold pending MD visit [] Discharge    Electronically signed by:   Sondra Mills PT